# Patient Record
Sex: FEMALE | Race: OTHER | HISPANIC OR LATINO | Employment: STUDENT | URBAN - METROPOLITAN AREA
[De-identification: names, ages, dates, MRNs, and addresses within clinical notes are randomized per-mention and may not be internally consistent; named-entity substitution may affect disease eponyms.]

---
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
## 2024-01-13 ENCOUNTER — HOSPITAL ENCOUNTER (EMERGENCY)
Facility: HOSPITAL | Age: 19
Discharge: HOME | End: 2024-01-13
Attending: EMERGENCY MEDICINE
Payer: COMMERCIAL

## 2024-01-13 ENCOUNTER — APPOINTMENT (EMERGENCY)
Dept: CT IMAGING | Facility: HOSPITAL | Age: 19
End: 2024-01-13
Payer: COMMERCIAL

## 2024-01-13 VITALS
HEART RATE: 82 BPM | DIASTOLIC BLOOD PRESSURE: 79 MMHG | OXYGEN SATURATION: 98 % | RESPIRATION RATE: 16 BRPM | BODY MASS INDEX: 26.97 KG/M2 | SYSTOLIC BLOOD PRESSURE: 162 MMHG | WEIGHT: 138.1 LBS | TEMPERATURE: 97.9 F

## 2024-01-13 DIAGNOSIS — N94.6 DYSMENORRHEA: Primary | ICD-10-CM

## 2024-01-13 LAB
ALBUMIN SERPL BCP-MCNC: 4.5 G/DL (ref 3.5–5)
ALP SERPL-CCNC: 47 U/L (ref 34–104)
ALT SERPL W P-5'-P-CCNC: 11 U/L (ref 7–52)
ANION GAP SERPL CALCULATED.3IONS-SCNC: 12 MMOL/L
AST SERPL W P-5'-P-CCNC: 18 U/L (ref 13–39)
B-HCG SERPL-ACNC: <1 MIU/ML (ref 0–5)
BACTERIA UR QL AUTO: ABNORMAL /HPF
BASOPHILS # BLD AUTO: 0.11 THOUSANDS/ÂΜL (ref 0–0.1)
BASOPHILS NFR BLD AUTO: 2 % (ref 0–1)
BILIRUB SERPL-MCNC: 0.47 MG/DL (ref 0.2–1)
BILIRUB UR QL STRIP: NEGATIVE
BUN SERPL-MCNC: 10 MG/DL (ref 5–25)
CALCIUM SERPL-MCNC: 9.8 MG/DL (ref 8.4–10.2)
CHLORIDE SERPL-SCNC: 103 MMOL/L (ref 96–108)
CLARITY UR: ABNORMAL
CO2 SERPL-SCNC: 23 MMOL/L (ref 21–32)
COLOR UR: ABNORMAL
CREAT SERPL-MCNC: 0.8 MG/DL (ref 0.6–1.3)
EOSINOPHIL # BLD AUTO: 0.26 THOUSAND/ÂΜL (ref 0–0.61)
EOSINOPHIL NFR BLD AUTO: 4 % (ref 0–6)
ERYTHROCYTE [DISTWIDTH] IN BLOOD BY AUTOMATED COUNT: 12.1 % (ref 11.6–15.1)
GFR SERPL CREATININE-BSD FRML MDRD: 107 ML/MIN/1.73SQ M
GLUCOSE SERPL-MCNC: 157 MG/DL (ref 65–140)
GLUCOSE UR STRIP-MCNC: NEGATIVE MG/DL
HCT VFR BLD AUTO: 39.9 % (ref 34.8–46.1)
HGB BLD-MCNC: 13.2 G/DL (ref 11.5–15.4)
HGB UR QL STRIP.AUTO: 250
IMM GRANULOCYTES # BLD AUTO: 0.01 THOUSAND/UL (ref 0–0.2)
IMM GRANULOCYTES NFR BLD AUTO: 0 % (ref 0–2)
KETONES UR STRIP-MCNC: ABNORMAL MG/DL
LEUKOCYTE ESTERASE UR QL STRIP: 25
LYMPHOCYTES # BLD AUTO: 3.11 THOUSANDS/ÂΜL (ref 0.6–4.47)
LYMPHOCYTES NFR BLD AUTO: 48 % (ref 14–44)
MCH RBC QN AUTO: 29 PG (ref 26.8–34.3)
MCHC RBC AUTO-ENTMCNC: 33.1 G/DL (ref 31.4–37.4)
MCV RBC AUTO: 88 FL (ref 82–98)
MONOCYTES # BLD AUTO: 0.44 THOUSAND/ÂΜL (ref 0.17–1.22)
MONOCYTES NFR BLD AUTO: 7 % (ref 4–12)
MUCOUS THREADS UR QL AUTO: ABNORMAL
NEUTROPHILS # BLD AUTO: 2.53 THOUSANDS/ÂΜL (ref 1.85–7.62)
NEUTS SEG NFR BLD AUTO: 39 % (ref 43–75)
NITRITE UR QL STRIP: NEGATIVE
NON-SQ EPI CELLS URNS QL MICRO: ABNORMAL /HPF
NRBC BLD AUTO-RTO: 0 /100 WBCS
PH UR STRIP.AUTO: 5 [PH]
PLATELET # BLD AUTO: 408 THOUSANDS/UL (ref 149–390)
PMV BLD AUTO: 10.7 FL (ref 8.9–12.7)
POTASSIUM SERPL-SCNC: 3.7 MMOL/L (ref 3.5–5.3)
PROT SERPL-MCNC: 7.3 G/DL (ref 6.4–8.4)
PROT UR STRIP-MCNC: ABNORMAL MG/DL
RBC # BLD AUTO: 4.55 MILLION/UL (ref 3.81–5.12)
RBC #/AREA URNS AUTO: ABNORMAL /HPF
SODIUM SERPL-SCNC: 138 MMOL/L (ref 135–147)
SP GR UR STRIP.AUTO: 1.02 (ref 1–1.04)
UROBILINOGEN UA: NEGATIVE MG/DL
WBC # BLD AUTO: 6.46 THOUSAND/UL (ref 4.31–10.16)
WBC #/AREA URNS AUTO: ABNORMAL /HPF

## 2024-01-13 PROCEDURE — 36415 COLL VENOUS BLD VENIPUNCTURE: CPT

## 2024-01-13 PROCEDURE — 99285 EMERGENCY DEPT VISIT HI MDM: CPT

## 2024-01-13 PROCEDURE — 99284 EMERGENCY DEPT VISIT MOD MDM: CPT

## 2024-01-13 PROCEDURE — 81001 URINALYSIS AUTO W/SCOPE: CPT

## 2024-01-13 PROCEDURE — 96361 HYDRATE IV INFUSION ADD-ON: CPT

## 2024-01-13 PROCEDURE — 74177 CT ABD & PELVIS W/CONTRAST: CPT

## 2024-01-13 PROCEDURE — 80053 COMPREHEN METABOLIC PANEL: CPT

## 2024-01-13 PROCEDURE — 85025 COMPLETE CBC W/AUTO DIFF WBC: CPT

## 2024-01-13 PROCEDURE — 96375 TX/PRO/DX INJ NEW DRUG ADDON: CPT

## 2024-01-13 PROCEDURE — 84702 CHORIONIC GONADOTROPIN TEST: CPT

## 2024-01-13 PROCEDURE — 96374 THER/PROPH/DIAG INJ IV PUSH: CPT

## 2024-01-13 PROCEDURE — 81003 URINALYSIS AUTO W/O SCOPE: CPT

## 2024-01-13 PROCEDURE — G1004 CDSM NDSC: HCPCS

## 2024-01-13 RX ORDER — DROPERIDOL 2.5 MG/ML
0.62 INJECTION, SOLUTION INTRAMUSCULAR; INTRAVENOUS ONCE
Status: COMPLETED | OUTPATIENT
Start: 2024-01-13 | End: 2024-01-13

## 2024-01-13 RX ORDER — HYDROMORPHONE HCL/PF 1 MG/ML
1 SYRINGE (ML) INJECTION ONCE
Status: COMPLETED | OUTPATIENT
Start: 2024-01-13 | End: 2024-01-13

## 2024-01-13 RX ORDER — KETOROLAC TROMETHAMINE 30 MG/ML
15 INJECTION, SOLUTION INTRAMUSCULAR; INTRAVENOUS ONCE
Status: DISCONTINUED | OUTPATIENT
Start: 2024-01-13 | End: 2024-01-13

## 2024-01-13 RX ORDER — ONDANSETRON 4 MG/1
4 TABLET, ORALLY DISINTEGRATING ORAL ONCE
Status: DISCONTINUED | OUTPATIENT
Start: 2024-01-13 | End: 2024-01-13

## 2024-01-13 RX ORDER — ONDANSETRON 2 MG/ML
4 INJECTION INTRAMUSCULAR; INTRAVENOUS ONCE
Status: COMPLETED | OUTPATIENT
Start: 2024-01-13 | End: 2024-01-13

## 2024-01-13 RX ORDER — KETOROLAC TROMETHAMINE 30 MG/ML
15 INJECTION, SOLUTION INTRAMUSCULAR; INTRAVENOUS ONCE
Status: COMPLETED | OUTPATIENT
Start: 2024-01-13 | End: 2024-01-13

## 2024-01-13 RX ADMIN — SODIUM CHLORIDE 1000 ML: 0.9 INJECTION, SOLUTION INTRAVENOUS at 16:35

## 2024-01-13 RX ADMIN — IOHEXOL 100 ML: 350 INJECTION, SOLUTION INTRAVENOUS at 16:49

## 2024-01-13 RX ADMIN — KETOROLAC TROMETHAMINE 15 MG: 30 INJECTION, SOLUTION INTRAMUSCULAR; INTRAVENOUS at 15:41

## 2024-01-13 RX ADMIN — HYDROMORPHONE HYDROCHLORIDE 1 MG: 1 INJECTION, SOLUTION INTRAMUSCULAR; INTRAVENOUS; SUBCUTANEOUS at 16:05

## 2024-01-13 RX ADMIN — DROPERIDOL 0.62 MG: 2.5 INJECTION, SOLUTION INTRAMUSCULAR; INTRAVENOUS at 15:51

## 2024-01-13 RX ADMIN — ONDANSETRON 4 MG: 2 INJECTION INTRAMUSCULAR; INTRAVENOUS at 15:39

## 2024-01-13 NOTE — Clinical Note
Brian Guthrie was seen and treated in our emergency department on 1/13/2024.    No restrictions            Diagnosis:     Brian  .    She may return on this date: 01/15/2024         If you have any questions or concerns, please don't hesitate to call.      Gurwinder Cintron PA-C    ______________________________           _______________          _______________  Hospital Representative                              Date                                Time

## 2024-01-13 NOTE — ED PROVIDER NOTES
History  Chief Complaint   Patient presents with    Abdominal Cramping     Arrives reporting severe abd cramping that started this morning with her menses. States this has been happening the last few months. No meds PTA. No discharge or change in menses otherwise. Vomited a few times today.      Patient is a 18-year-old female coming in for evaluation of severe abdominal pain, nausea, vomiting.  Patient does have a history of dysmenorrhea, has not yet made an appointment OB/GYN.  Has not taken any pain medications or Zofran prior to arrival.  Patient is currently actively retching, clutching her stomach, and appears to be in distress, but nontoxic      Abdominal Cramping  Pain location:  Generalized  Associated symptoms: nausea and vomiting    Associated symptoms: no fatigue, no fever, no hematuria and no shortness of breath        Prior to Admission Medications   Prescriptions Last Dose Informant Patient Reported? Taking?   Diclofenac Sodium (VOLTAREN) 1 %   No No   Sig: Apply 2 g topically 4 (four) times a day for 5 days   acetaminophen (TYLENOL) 500 mg tablet   No No   Sig: Take 1 tablet (500 mg total) by mouth every 6 (six) hours as needed for moderate pain   acetaminophen (TYLENOL) 500 mg tablet   No No   Sig: Take 1 tablet (500 mg total) by mouth every 6 (six) hours as needed for mild pain   fluticasone (FLONASE) 50 mcg/act nasal spray   No No   Si spray into each nostril daily   fluticasone (FLONASE) 50 mcg/act nasal spray   No No   Si spray into each nostril daily   ibuprofen (MOTRIN) 400 mg tablet   No No   Sig: Take 1 tablet (400 mg total) by mouth every 6 (six) hours as needed for mild pain   ibuprofen (MOTRIN) 400 mg tablet   No No   Sig: Take 1 tablet (400 mg total) by mouth every 6 (six) hours as needed for mild pain for up to 15 days   loratadine (CLARITIN) 10 mg tablet   No No   Sig: Take 1 tablet (10 mg total) by mouth daily   Patient not taking: Reported on 2023   methocarbamol  (ROBAXIN) 500 mg tablet   No No   Sig: Take 1 tablet (500 mg total) by mouth 4 (four) times a day for 7 days   naproxen (Naprosyn) 500 mg tablet   No No   Sig: Take 1 tablet (500 mg total) by mouth 2 (two) times a day with meals   Patient not taking: Reported on 12/12/2023   naproxen (Naprosyn) 500 mg tablet   No No   Sig: Take 1 tablet (500 mg total) by mouth 2 (two) times a day with meals   neomycin-polymyxin-hydrocortisone (CORTISPORIN) 0.35%-10,000 units/mL-1% otic suspension   No No   Sig: Administer 4 drops to the right ear 3 (three) times a day   Patient not taking: Reported on 12/12/2023   ondansetron (ZOFRAN) 4 mg tablet   No No   Sig: Take 1 tablet (4 mg total) by mouth every 6 (six) hours   Patient not taking: Reported on 12/12/2023      Facility-Administered Medications: None       Past Medical History:   Diagnosis Date    Asthma        History reviewed. No pertinent surgical history.    History reviewed. No pertinent family history.  I have reviewed and agree with the history as documented.    E-Cigarette/Vaping    E-Cigarette Use Current Every Day User      E-Cigarette/Vaping Substances    Nicotine Yes      Social History     Tobacco Use    Smoking status: Never    Smokeless tobacco: Never   Vaping Use    Vaping status: Every Day    Substances: Nicotine   Substance Use Topics    Alcohol use: Never    Drug use: Never       Review of Systems   Constitutional:  Negative for fatigue and fever.   Respiratory:  Negative for chest tightness and shortness of breath.    Gastrointestinal:  Positive for abdominal pain, nausea and vomiting.   Genitourinary:  Negative for hematuria.       Physical Exam  Physical Exam  Vitals reviewed.   Constitutional:       General: She is in acute distress.      Appearance: Normal appearance. She is normal weight. She is not ill-appearing or toxic-appearing.   HENT:      Head: Normocephalic and atraumatic.      Right Ear: External ear normal.      Left Ear: External ear normal.       Nose: Nose normal.   Eyes:      Conjunctiva/sclera: Conjunctivae normal.   Cardiovascular:      Rate and Rhythm: Normal rate.   Pulmonary:      Effort: Pulmonary effort is normal.   Abdominal:      Palpations: Abdomen is soft.      Tenderness: There is no abdominal tenderness. There is no guarding.   Musculoskeletal:         General: Normal range of motion.      Cervical back: Normal range of motion.   Skin:     General: Skin is warm and dry.   Neurological:      Mental Status: She is alert.         Vital Signs  ED Triage Vitals   Temperature Pulse Respirations Blood Pressure SpO2   01/13/24 1534 01/13/24 1534 01/13/24 1534 01/13/24 1534 01/13/24 1534   97.9 °F (36.6 °C) 66 20 106/78 100 %      Temp Source Heart Rate Source Patient Position - Orthostatic VS BP Location FiO2 (%)   01/13/24 1534 01/13/24 1534 01/13/24 1534 01/13/24 1534 --   Tympanic Monitor Lying Left arm       Pain Score       01/13/24 1541       10 - Worst Possible Pain           Vitals:    01/13/24 1534 01/13/24 1702   BP: 106/78 162/79   Pulse: 66 82   Patient Position - Orthostatic VS: Lying Lying         Visual Acuity      ED Medications  Medications   ketorolac (TORADOL) injection 15 mg (15 mg Intravenous Given 1/13/24 1541)   ondansetron (ZOFRAN) injection 4 mg (4 mg Intravenous Given 1/13/24 1539)   droperidol (INAPSINE) injection 0.625 mg (0.625 mg Intravenous Given 1/13/24 1551)   HYDROmorphone (DILAUDID) injection 1 mg (1 mg Intravenous Given 1/13/24 1605)   iohexol (OMNIPAQUE) 350 MG/ML injection (MULTI-DOSE) 100 mL (100 mL Intravenous Given 1/13/24 1649)   sodium chloride 0.9 % bolus 1,000 mL (0 mL Intravenous Stopped 1/13/24 1805)       Diagnostic Studies  Results Reviewed       Procedure Component Value Units Date/Time    Urine Microscopic [[id number]]  (Abnormal) Collected: 01/13/24 1721    Lab Status: Final result Specimen: Urine, Clean Catch Updated: 01/13/24 1810     RBC, UA 30-50 /hpf      WBC, UA 4-10 /hpf      Epithelial  Cells Moderate /hpf      Bacteria, UA Moderate /hpf      MUCUS THREADS Occasional    UA w Reflex to Microscopic w Reflex to Culture [[id number]]  (Abnormal) Collected: 01/13/24 1721    Lab Status: Final result Specimen: Urine, Clean Catch Updated: 01/13/24 1755     Color, UA Rosy     Clarity, UA Slightly Cloudy     Specific Gravity, UA 1.020     pH, UA 5.0     Leukocytes, UA 25.0     Nitrite, UA Negative     Protein, UA 30 (1+) mg/dl      Glucose, UA Negative mg/dl      Ketones, UA 5 (Trace) mg/dl      Bilirubin, UA Negative     Occult Blood, .0     UROBILINOGEN UA Negative mg/dL     hCG, quantitative [[id number]]  (Normal) Collected: 01/13/24 1542    Lab Status: Final result Specimen: Blood from Arm, Right Updated: 01/13/24 1631     HCG, Quant <1 mIU/mL     Narrative:       Expected Ranges:    HCG results between 5 and 25 mIU/mL may be indicative of early pregnancy but should be interpreted in light of the total clinical presentation.    HCG can rise to detectable levels in sylvester and post menopausal women (0-11.6 mIU/mL).     Approximate               Approximate HCG  Gestation age          Concentration ( mIU/mL)  _____________          ______________________   Weeks                      HCG values  0.2-1                       5-50  1-2                           2-3                         100-5000  3-4                         500-84491  4-5                         1000-71526  5-6                         [id number]  6-8                         60405-095609  8-12                        [id number]      Comprehensive metabolic panel [[id number]]  (Abnormal) Collected: 01/13/24 1542    Lab Status: Final result Specimen: Blood from Arm, Right Updated: 01/13/24 1604     Sodium 138 mmol/L      Potassium 3.7 mmol/L      Chloride 103 mmol/L      CO2 23 mmol/L      ANION GAP 12 mmol/L      BUN 10 mg/dL      Creatinine 0.80 mg/dL      Glucose 157 mg/dL      Calcium 9.8 mg/dL      AST 18 U/L      ALT 11 U/L       Alkaline Phosphatase 47 U/L      Total Protein 7.3 g/dL      Albumin 4.5 g/dL      Total Bilirubin 0.47 mg/dL      eGFR 107 ml/min/1.73sq m     Narrative:      National Kidney Disease Foundation guidelines for Chronic Kidney Disease (CKD):     Stage 1 with normal or high GFR (GFR > 90 mL/min/1.73 square meters)    Stage 2 Mild CKD (GFR = 60-89 mL/min/1.73 square meters)    Stage 3A Moderate CKD (GFR = 45-59 mL/min/1.73 square meters)    Stage 3B Moderate CKD (GFR = 30-44 mL/min/1.73 square meters)    Stage 4 Severe CKD (GFR = 15-29 mL/min/1.73 square meters)    Stage 5 End Stage CKD (GFR <15 mL/min/1.73 square meters)  Note: GFR calculation is accurate only with a steady state creatinine    CBC and differential [[id number]]  (Abnormal) Collected: 01/13/24 1542    Lab Status: Final result Specimen: Blood from Arm, Right Updated: 01/13/24 1548     WBC 6.46 Thousand/uL      RBC 4.55 Million/uL      Hemoglobin 13.2 g/dL      Hematocrit 39.9 %      MCV 88 fL      MCH 29.0 pg      MCHC 33.1 g/dL      RDW 12.1 %      MPV 10.7 fL      Platelets 408 Thousands/uL      nRBC 0 /100 WBCs      Neutrophils Relative 39 %      Immat GRANS % 0 %      Lymphocytes Relative 48 %      Monocytes Relative 7 %      Eosinophils Relative 4 %      Basophils Relative 2 %      Neutrophils Absolute 2.53 Thousands/µL      Immature Grans Absolute 0.01 Thousand/uL      Lymphocytes Absolute 3.11 Thousands/µL      Monocytes Absolute 0.44 Thousand/µL      Eosinophils Absolute 0.26 Thousand/µL      Basophils Absolute 0.11 Thousands/µL                    CT abdomen pelvis with contrast   Final Result by Eliceo Rubio MD (01/13 1804)      Mild bladder wall thickening. Correlate for acute cystitis.      Simple left ovarian cyst measuring 3.1 cm.            Workstation performed: [id number]                    Procedures  Procedures         ED Course  ED Course as of 01/13/24 1922   Sat Jan 13, 2024   1608 Anion Gap: 12   1611 Patient continues to have  "severe pain after Toradol.  Continue to vomit, and crying out in pain.  Given droperidol with no significant effect seen.  Will give Dilaudid, and get imaging   1633 HCG QUANTITATIVE: <1   1756 Blood, UA(!): 250.0  On her period   1808 CT abdomen pelvis with contrast  Mild bladder wall thickening. Correlate for acute cystitis.     Simple left ovarian cyst measuring 3.1 cm.     1810 Bacteria, UA(!): Moderate  Dirty catch. No dysuria   1810 Pain is better. Will d/c         CRAFFT      Flowsheet Row Most Recent Value   CRAFFT Initial Screen: During the past 12 months, did you:    1. Drink any alcohol (more than a few sips)?  No Filed at: 01/13/2024 6759   2. Smoke any marijuana or hashish No Filed at: 01/13/2024 9102   3. Use anything else to get high? (\"anything else\" includes illegal drugs, over the counter and prescription drugs, and things that you sniff or 'singh')? No Filed at: 01/13/2024 6473                                            Medical Decision Making  Patient is a 18-year-old female who comes in for evaluation of abdominal pain.  Labs and CAT scan are overall benign.  Patient does have a history of severe dysmenorrhea.  There is a large ovarian cyst, but no sign of ovarian torsion on CAT scan.  Patient did feel bloated after Dilaudid, and droperidol, Toradol.  Patient was discharged home with the name of a local OB/GYN, and encouraged to follow-up for further evaluation and treatment    Amount and/or Complexity of Data Reviewed  Labs: ordered. Decision-making details documented in ED Course.  Radiology: ordered. Decision-making details documented in ED Course.    Risk  Prescription drug management.             Disposition  Final diagnoses:   Dysmenorrhea     Time reflects when diagnosis was documented in both MDM as applicable and the Disposition within this note       Time User Action Codes Description Comment    1/13/2024  6:09 PM Gurwinder Cintron Add [N94.6] Dysmenorrhea           ED Disposition       " ED Disposition   Discharge    Condition   Stable    Date/Time   Sat Jan 13, 2024 1807    Comment   Brian Guthrie discharge to home/self care.                   Follow-up Information       Follow up With Specialties Details Why Contact Info Additional Information    [facility]   [address]  [address]  [facility] [address] [phone]       [facility] Emergency Department Emergency Medicine  As needed, If symptoms worsen [address] [facility] [address] [phone] [facility] Emergency Department    [facility]etric[facility] Gyne[facility]   [address] [place] [address] [phone] [facility], [address] [place] [address], [address], [place], [place], [address] [phone]            Discharge Medication List as of 1/13/2024  6:10 PM        CONTINUE these medications which have NOT CHANGED    Details   !! acetaminophen (TYLENOL) 500 mg tablet Take 1 tablet (500 mg total) by mouth every 6 (six) hours as needed for moderate pain, Starting Wed 10/26/2022, Normal      !! acetaminophen (TYLENOL) 500 mg tablet Take 1 tablet (500 mg total) by mouth every 6 (six) hours as needed for mild pain, Starting Mon 6/12/2023, Normal      Diclofenac Sodium (VOLTAREN) 1 % Apply 2 g topically 4 (four) times a day for 5 days, Starting Tue 10/10/2023, Until Sun 10/15/2023, Normal      !! fluticasone (FLONASE) 50 mcg/act nasal spray 1 spray into each nostril daily, Starting Wed 10/26/2022, Normal      !! fluticasone (FLONASE) 50 mcg/act nasal spray 1 spray into each nostril daily, Starting Wed 4/26/2023, Normal      ibuprofen (MOTRIN) 400 mg tablet Take 1 tablet (400 mg total) by mouth every 6 (six) hours as needed for mild pain, Starting Thu 11/18/2021, Normal      loratadine (CLARITIN) 10 mg tablet Take 1 tablet (10 mg total) by mouth daily, Starting Wed  4/26/2023, Normal      methocarbamol (ROBAXIN) 500 mg tablet Take 1 tablet (500 mg total) by mouth 4 (four) times a day for 7 days, Starting Tue 10/10/2023, Until Tue 10/17/2023, Normal      !! naproxen (Naprosyn) 500 mg tablet Take 1 tablet (500 mg total) by mouth 2 (two) times a day with meals, Starting Mon 6/12/2023, Normal      !! naproxen (Naprosyn) 500 mg tablet Take 1 tablet (500 mg total) by mouth 2 (two) times a day with meals, Starting Tue 12/12/2023, Normal      neomycin-polymyxin-hydrocortisone (CORTISPORIN) 0.35%-10,000 units/mL-1% otic suspension Administer 4 drops to the right ear 3 (three) times a day, Starting Wed 10/26/2022, Normal      ondansetron (ZOFRAN) 4 mg tablet Take 1 tablet (4 mg total) by mouth every 6 (six) hours, Starting Mon 6/12/2023, Normal       !! - Potential duplicate medications found. Please discuss with provider.          No discharge procedures on file.    PDMP Review       None            ED Provider  Electronically Signed by             Gurwinder Cintron PA-C  01/13/24 1922

## 2024-02-13 ENCOUNTER — TELEPHONE (OUTPATIENT)
Dept: OBGYN CLINIC | Facility: CLINIC | Age: 19
End: 2024-02-13

## 2024-03-07 ENCOUNTER — ULTRASOUND (OUTPATIENT)
Dept: OBGYN CLINIC | Facility: CLINIC | Age: 19
End: 2024-03-07

## 2024-03-07 VITALS
WEIGHT: 135.6 LBS | BODY MASS INDEX: 25.6 KG/M2 | SYSTOLIC BLOOD PRESSURE: 138 MMHG | HEIGHT: 61 IN | DIASTOLIC BLOOD PRESSURE: 85 MMHG | HEART RATE: 90 BPM

## 2024-03-07 DIAGNOSIS — N91.2 AMENORRHEA: Primary | ICD-10-CM

## 2024-03-07 LAB — SL AMB POCT URINE HCG: POSITIVE

## 2024-03-07 NOTE — PROGRESS NOTES
[facility]TRAS[facility]  3/7/2024  Rosa Greco MD     INDICATION: Amenorrhea, viability    COMPARISON: None.     TECHNIQUE:   Transvaginal imaging was performed to assess the gestation, myometrial/endometrial architecture and ovarian parenchymal detail.    The study includes volumetric sweeps and traditional still imaging technique.      FINDINGS:     A single intrauterine gestation is identified.  Cardiac activity is detected at 162bpm.      YOLK SAC:  Present and normal in size and appearance.  MEAN CROWN RUMP LENGTH:  1.2 cm = 7 weeks 3 days   AMNIOTIC FLUID/SAC SHAPE:  Within expected normal range.     UTERUS/ADNEXA:   No adnexal mass or pathologic cyst.  No free fluid identified.     IMPRESSION:    According to , will date based off LMP (1/13/2024)  Final JABIER:  10/19/2024  Gestational age:  7w5d  Fetal cardiac activity detected.  No adnexal masses seen.     Dr. Frazier was present for the entire visit    Rosa Greco MD  Obstetrics & Gynecology, PGY-2

## 2024-03-18 ENCOUNTER — INITIAL PRENATAL (OUTPATIENT)
Dept: OBGYN CLINIC | Facility: CLINIC | Age: 19
End: 2024-03-18

## 2024-03-18 VITALS
SYSTOLIC BLOOD PRESSURE: 140 MMHG | HEART RATE: 74 BPM | HEIGHT: 61 IN | BODY MASS INDEX: 25.68 KG/M2 | DIASTOLIC BLOOD PRESSURE: 73 MMHG | WEIGHT: 136 LBS

## 2024-03-18 DIAGNOSIS — Z59.819 HOUSING INSTABILITY: Primary | ICD-10-CM

## 2024-03-18 DIAGNOSIS — Z3A.09 9 WEEKS GESTATION OF PREGNANCY: ICD-10-CM

## 2024-03-18 DIAGNOSIS — Z34.91 PRENATAL CARE IN FIRST TRIMESTER: ICD-10-CM

## 2024-03-18 DIAGNOSIS — Z31.430 ENCOUNTER OF FEMALE FOR TESTING FOR GENETIC DISEASE CARRIER STATUS FOR PROCREATIVE MANAGEMENT: ICD-10-CM

## 2024-03-18 PROCEDURE — 99211 OFF/OP EST MAY X REQ PHY/QHP: CPT

## 2024-03-18 SDOH — ECONOMIC STABILITY - HOUSING INSECURITY: HOUSING INSTABILITY UNSPECIFIED: Z59.819

## 2024-03-18 NOTE — LETTER
"    [facility] REFERRAL      Date: 3/18/2024    Patient name: Brian Guthrie    YOB: 2005    Estimated Date of Delivery: 10/19/24      /73 (BP Location: Right arm, Patient Position: Sitting, Cuff Size: Standard)   Pulse 74   Ht 5' 1\" (1.549 m)   Wt 61.7 kg (136 lb)   LMP 01/13/2024 (Exact Date)   Breastfeeding No   BMI 25.70 kg/m²         Thank you,  ANA LAURA Ibarra            [facility] locations:   1. [facility]       [address] [place] [address]       Phone: [phone]       Fax: [phone]     2. [facility]       [address] [address]       Phone: [phone]       Fax: [phone]       "

## 2024-03-18 NOTE — PROGRESS NOTES
"OBSTETRIC INTAKE VISIT    Brian Guthrie presents today for initial OB visit and intake at 9w2d. LMP - 24. History obtained from patient and she reports it as follows:    Past Medical History:   Diagnosis Date    Asthma      History reviewed. No pertinent surgical history.  OB History    Para Term  AB Living   1 0   0 0 0   SAB IAB Ectopic Multiple Live Births   0 0 0 0 0      # Outcome Date GA Lbr [place]/2nd Weight Sex Delivery Anes PTL Lv   1 Current              Social History     Tobacco Use    Smoking status: Never    Smokeless tobacco: Never   Vaping Use    Vaping status: Every Day    Last attempt to quit: 2024    Substances: Nicotine   Substance Use Topics    Alcohol use: Never    Drug use: Never       Current Outpatient Medications   Medication Instructions    acetaminophen (TYLENOL) 500 mg, Oral, Every 6 hours PRN    acetaminophen (TYLENOL) 500 mg, Oral, Every 6 hours PRN    Diclofenac Sodium (VOLTAREN) 2 g, Topical, 4 times daily    fluticasone (FLONASE) 50 mcg/act nasal spray 1 spray, Nasal, Daily    fluticasone (FLONASE) 50 mcg/act nasal spray 1 spray, Nasal, Daily    ibuprofen (MOTRIN) 400 mg, Oral, Every 6 hours PRN    ibuprofen (MOTRIN) 400 mg, Oral, Every 6 hours PRN    loratadine (CLARITIN) 10 mg, Oral, Daily    methocarbamol (ROBAXIN) 500 mg, Oral, 4 times daily    naproxen (NAPROSYN) 500 mg, Oral, 2 times daily with meals    naproxen (NAPROSYN) 500 mg, Oral, 2 times daily with meals    neomycin-polymyxin-hydrocortisone (CORTISPORIN) 0.35%-10,000 units/mL-1% otic suspension 4 drops, Right Ear, 3 times daily    ondansetron (ZOFRAN) 4 mg, Oral, Every 6 hours       Allergies   Allergen Reactions    Amoxicillin     Penicillins Hives and Other (See Comments)     Not known at this time. Presumably amoxicillin rash in infancy         Vitals: /73 (BP Location: Right arm, Patient Position: Sitting, Cuff Size: Standard)   Pulse 74   Ht 5' 1\" (1.549 m)   Wt 61.7 kg (136 lb)   " LMP 01/13/2024 (Exact Date)   Breastfeeding No   BMI 25.70 kg/m²     Review of Systems:  Denies vaginal bleeding or leaking fluid.  Denies abdominal/pelvic pain or contractions.    Plan:  1. OB labwork ordered today to include Prenatal Panel, HCV antibody, Hgb fractionation cascade, Prenatal Carrier Screen Panel.  Other labs include Glucose 1H PG.  Advised patient to have drawn within the next few days.  2. Will help pt schedule ultrasound with M.  3. Return 4/10/24 for H&P prenatal visit.  4. Referrals placed for WIC, , and [facility].  5. Given vaccines: Flu vaccine declined.  6. Patient's depression screening was assessed with a PHQ-2 score of 0. Their PHQ-9 score was 2. Clinically patient does not have depression. No treatment is required.   will call or see pt at her next visit.  7. Reviewed the following educational topics with patient:   -routine prenatal visit/ultrasound/labwork schedule   -hospital for delivery and office phone/answering service contact information   -nutritional demands of pregnancy, healthy dietary habits   -listeria, toxoplasmosis, seafood precautions   -weight gain expectations (based on pre-pregnant BMI)   -exercise, rest, and sexual activity during pregnancy   -abstinence from alcohol, tobacco, and illegal drugs   -common discomforts of pregnancy and appropriate management   -OTC medications safe to use in pregnancy   -genetic screening options   -vaccines in pregnancy   -symptoms to report to OB provider    -signs of PTL and pre-eclampsia    -vaginal bleeding/leaking of fluid    -severe n/v-unable to tolerate ANY food/fluids for more than 24 hours

## 2024-03-18 NOTE — PATIENT INSTRUCTIONS
WARNING SIGNS DURING PREGNANCY  Call our office at [phone] for any of the followin. Vaginal bleeding  2. Sharp abdominal pain that does not go away  3. Fever (more than 100.4 and is not relieved by Tylenol)  4. Persistent vomiting lasting greater than 24 hours  5. Chest pain   6. Pain or burning when you urinate  7. Severe headache that doesn't resolve with Tylenol  8. Blurred vision or seeing spots in your vision  9. Sudden swelling of your face or hands  10. Redness, swelling or pain in a leg  11. A sudden weight gain in just a few days  12. Decrease in your baby's movement (after 28 weeks or the 6th month of pregnancy)  13. A loss of watery fluid from your vagina - can be a gush, a trickle or continuous wetness  14. After 20 weeks of pregnancy, rhythmic cramping (greater than 4 per hour) or menstrual like low/pelvic pain

## 2024-03-18 NOTE — LETTER
3/18/2024      This letter is to confirm that Brian Guthrie is pregnant and is under our care.  Her Estimated Date of Delivery: 10/19/24.    If you have any questions or concerns, please contact our office.  Thank you,    ANA LAURA Ibarra

## 2024-04-02 ENCOUNTER — PATIENT OUTREACH (OUTPATIENT)
Dept: OBGYN CLINIC | Facility: CLINIC | Age: 19
End: 2024-04-02

## 2024-04-02 NOTE — PROGRESS NOTES
DANIEL SHAH was referred by ANA LAURA Pena to complete a PN SW assessment. The patient is , she is [id number], her JABIER is 10/19/2024. The patient is english speaking. DANIEL SHAH attemped to outreach by phone today to complete the assessment. There was no answer. DANIEL SHAH left a  for a return call. Pts next appointment is scheduled for 04/10

## 2024-04-06 ENCOUNTER — HOSPITAL ENCOUNTER (EMERGENCY)
Facility: HOSPITAL | Age: 19
Discharge: HOME | End: 2024-04-06
Attending: EMERGENCY MEDICINE
Payer: COMMERCIAL

## 2024-04-06 VITALS
RESPIRATION RATE: 18 BRPM | TEMPERATURE: 98.3 F | SYSTOLIC BLOOD PRESSURE: 133 MMHG | OXYGEN SATURATION: 100 % | WEIGHT: 138.01 LBS | DIASTOLIC BLOOD PRESSURE: 72 MMHG | HEART RATE: 93 BPM | BODY MASS INDEX: 26.08 KG/M2

## 2024-04-06 DIAGNOSIS — R51.9 HEADACHE: Primary | ICD-10-CM

## 2024-04-06 PROCEDURE — 96374 THER/PROPH/DIAG INJ IV PUSH: CPT

## 2024-04-06 PROCEDURE — 96361 HYDRATE IV INFUSION ADD-ON: CPT

## 2024-04-06 PROCEDURE — 99283 EMERGENCY DEPT VISIT LOW MDM: CPT

## 2024-04-06 PROCEDURE — 99284 EMERGENCY DEPT VISIT MOD MDM: CPT | Performed by: EMERGENCY MEDICINE

## 2024-04-06 RX ORDER — METOCLOPRAMIDE HYDROCHLORIDE 5 MG/ML
10 INJECTION INTRAMUSCULAR; INTRAVENOUS ONCE
Status: COMPLETED | OUTPATIENT
Start: 2024-04-06 | End: 2024-04-06

## 2024-04-06 RX ORDER — BUTALBITAL, ACETAMINOPHEN AND CAFFEINE 50; 325; 40 MG/1; MG/1; MG/1
1 TABLET ORAL ONCE
Status: COMPLETED | OUTPATIENT
Start: 2024-04-06 | End: 2024-04-06

## 2024-04-06 RX ADMIN — SODIUM CHLORIDE 1000 ML: 0.9 INJECTION, SOLUTION INTRAVENOUS at 13:44

## 2024-04-06 RX ADMIN — BUTALBITAL, ACETAMINOPHEN, AND CAFFEINE 1 TABLET: 325; 50; 40 TABLET ORAL at 13:45

## 2024-04-06 RX ADMIN — METOCLOPRAMIDE 10 MG: 5 INJECTION, SOLUTION INTRAMUSCULAR; INTRAVENOUS at 13:44

## 2024-04-06 NOTE — ED PROVIDER NOTES
History  Chief Complaint   Patient presents with    Headache     Began yesterday with radiation to the L neck. Pt denies injury.      Patient is a 18-year-old female with past medical history of asthma, juvenile arthritis, and 12 weeks pregnant presenting with headache.  Patient states that she has had this left-sided headache for 2 days and it feels like a constant pounding that is a 7 out of 10 pain.  She states that it occasionally will go away a little bit but has never completely gone away.  She denies any migraine history personally, but her mom does have migraines.  She has photo/phonosensitivity.  She denies any blurry vision, facial weakness, numbness, or tingling or any other neurologic symptoms.  The headache was not maximal at onset and has been continuing to get worse.  She denies lightheadedness, dizziness, fever, chills, diaphoresis, chest pain, shortness of breath, abdominal pain, nausea, vomiting, urinary symptoms, numbness, tingling, or weakness throughout the body.        None       Past Medical History:   Diagnosis Date    Asthma     Juvenile arthritis (HCC) 10/10/2014       History reviewed. No pertinent surgical history.    Family History   Problem Relation Age of Onset    Diabetes Sister     Diabetes Maternal Grandmother     Diabetes Maternal Uncle     Cancer Neg Hx     Ovarian cancer Neg Hx     Colon cancer Neg Hx     Breast cancer Neg Hx      I have reviewed and agree with the history as documented.    E-Cigarette/Vaping    E-Cigarette Use Former User     Quit Date 2/26/24      E-Cigarette/Vaping Substances    Nicotine Yes      Social History     Tobacco Use    Smoking status: Never    Smokeless tobacco: Never   Vaping Use    Vaping status: Former    Quit date: 2/26/2024    Substances: Nicotine   Substance Use Topics    Alcohol use: Never    Drug use: Never        Review of Systems    Physical Exam  ED Triage Vitals [04/06/24 1258]   Temperature Pulse Respirations Blood Pressure SpO2   98.3  °F (36.8 °C) 93 18 133/72 100 %      Temp Source Heart Rate Source Patient Position - Orthostatic VS BP Location FiO2 (%)   Oral -- Sitting Right arm --      Pain Score       8             Orthostatic Vital Signs  Vitals:    04/06/24 1258   BP: 133/72   Pulse: 93   Patient Position - Orthostatic VS: Sitting       Physical Exam  Vitals and nursing note reviewed.   Constitutional:       General: She is not in acute distress.     Appearance: Normal appearance. She is not ill-appearing, toxic-appearing or diaphoretic.      Comments: Uncomfortable   HENT:      Head: Normocephalic and atraumatic.      Mouth/Throat:      Mouth: Mucous membranes are moist.      Pharynx: Oropharynx is clear. No oropharyngeal exudate or posterior oropharyngeal erythema.   Eyes:      General: No scleral icterus.        Right eye: No discharge.         Left eye: No discharge.      Extraocular Movements: Extraocular movements intact.      Pupils: Pupils are equal, round, and reactive to light.   Cardiovascular:      Rate and Rhythm: Normal rate and regular rhythm.      Heart sounds: Normal heart sounds.   Pulmonary:      Effort: Pulmonary effort is normal. No respiratory distress.      Breath sounds: Normal breath sounds.   Abdominal:      General: Abdomen is flat. There is no distension.      Palpations: Abdomen is soft.      Tenderness: There is no abdominal tenderness.   Musculoskeletal:         General: Normal range of motion.      Cervical back: Normal range of motion and neck supple.   Skin:     General: Skin is warm and dry.      Capillary Refill: Capillary refill takes less than 2 seconds.   Neurological:      General: No focal deficit present.      Mental Status: She is alert and oriented to person, place, and time.      Cranial Nerves: No cranial nerve deficit.      Sensory: No sensory deficit.      Motor: No weakness.         ED Medications  Medications   metoclopramide (REGLAN) injection 10 mg (10 mg Intravenous Given 4/6/24 1480)  "  sodium chloride 0.9 % bolus 1,000 mL (0 mL Intravenous Stopped 4/6/24 1451)   butalbital-acetaminophen-caffeine (FIORICET,ESGIC) -40 mg per tablet 1 tablet (1 tablet Oral Given 4/6/24 1345)       Diagnostic Studies  Results Reviewed       None                   No orders to display         Procedures  Procedures      ED Course         CRAFFT      Flowsheet Row Most Recent Value   CRAFFT Initial Screen: During the past 12 months, did you:    1. Drink any alcohol (more than a few sips)?  No Filed at: 04/06/2024 1317   2. Smoke any marijuana or hashish No Filed at: 04/06/2024 1317   3. Use anything else to get high? (\"anything else\" includes illegal drugs, over the counter and prescription drugs, and things that you sniff or 'singh')? No Filed at: 04/06/2024 1317                                      Medical Decision Making  Patient is a 18-year-old female presenting with headache.    Differential includes primary versus secondary headache.  Clinical signs and symptoms pointing towards primary headache.  Low concern for secondary headache at this time.  Will treat for primary headache and reassess.  Patient felt significantly better after treatment.    Patient cleared for discharge with PCP follow-up and return precautions.    Risk  Prescription drug management.          Disposition  Final diagnoses:   Headache     Time reflects when diagnosis was documented in both MDM as applicable and the Disposition within this note       Time User Action Codes Description Comment    4/6/2024  2:48 PM Jv Martini Add [R51.9] Headache           ED Disposition       ED Disposition   Discharge    Condition   Stable    Date/Time   Sat Apr 6, 2024 0290    Comment   Brian Guthrie discharge to home/self care.                   Follow-up Information       Follow up With Specialties Details Why Contact Info Additional Information    [facility]   [address]  [address]  [place] [address] [phone]    "    [facility]   [address] [id number] [phone] [facility], [address], [place], [address]            Patient's Medications   Discharge Prescriptions    No medications on file     No discharge procedures on file.    PDMP Review       None             ED Provider  Attending physically available and evaluated Brian Guthrie. I managed the patient along with the ED Attending.    Electronically Signed by           Jv Martini MD  04/06/24 1495

## 2024-04-06 NOTE — ED ATTENDING ATTESTATION
4/6/2024  I, Clarke Emerson MD, saw and evaluated the patient. I have discussed the patient with the resident/non-physician practitioner and agree with the resident's/non-physician practitioner's findings, Plan of Care, and MDM as documented in the resident's/non-physician practitioner's note, except where noted. All available labs and Radiology studies were reviewed.  I was present for key portions of any procedure(s) performed by the resident/non-physician practitioner and I was immediately available to provide assistance.       At this point I agree with the current assessment done in the Emergency Department.  I have conducted an independent evaluation of this patient a history and physical is as follows:  17 yo F c/o headache, at 12 weeks gestation G1P, described as left parietal, onset 2 days, with photophobia, phonophobia, no fever, no chills, no N/V.  She denies injury.  Chest clear.  Abd S/NT.  No focal neuro deficits.  No neck stiffness.        ED Course         Critical Care Time  Procedures

## 2024-04-06 NOTE — DISCHARGE INSTRUCTIONS
Please follow-up with your primary care provider.  Please return to the ED with new or worsening symptoms-see attached.

## 2024-04-10 ENCOUNTER — INITIAL PRENATAL (OUTPATIENT)
Dept: OBGYN CLINIC | Facility: CLINIC | Age: 19
End: 2024-04-10

## 2024-04-10 ENCOUNTER — PATIENT OUTREACH (OUTPATIENT)
Dept: OBGYN CLINIC | Facility: CLINIC | Age: 19
End: 2024-04-10

## 2024-04-10 VITALS
HEIGHT: 61 IN | SYSTOLIC BLOOD PRESSURE: 136 MMHG | HEART RATE: 92 BPM | DIASTOLIC BLOOD PRESSURE: 83 MMHG | BODY MASS INDEX: 27.03 KG/M2 | WEIGHT: 143.2 LBS

## 2024-04-10 DIAGNOSIS — G43.909 MIGRAINE WITHOUT STATUS MIGRAINOSUS, NOT INTRACTABLE, UNSPECIFIED MIGRAINE TYPE: ICD-10-CM

## 2024-04-10 DIAGNOSIS — Z3A.12 12 WEEKS GESTATION OF PREGNANCY: ICD-10-CM

## 2024-04-10 DIAGNOSIS — Z34.91 PRENATAL CARE IN FIRST TRIMESTER: Primary | ICD-10-CM

## 2024-04-10 DIAGNOSIS — Z11.3 SCREEN FOR STD (SEXUALLY TRANSMITTED DISEASE): ICD-10-CM

## 2024-04-10 DIAGNOSIS — Z86.69 HISTORY OF MIGRAINE HEADACHES: ICD-10-CM

## 2024-04-10 PROCEDURE — 87491 CHLMYD TRACH DNA AMP PROBE: CPT

## 2024-04-10 PROCEDURE — 87591 N.GONORRHOEAE DNA AMP PROB: CPT

## 2024-04-10 PROCEDURE — 99213 OFFICE O/P EST LOW 20 MIN: CPT | Performed by: OBSTETRICS & GYNECOLOGY

## 2024-04-10 RX ORDER — PNV NO.95/FERROUS FUM/FOLIC AC 28MG-0.8MG
1 TABLET ORAL DAILY
Qty: 90 TABLET | Refills: 4 | Status: SHIPPED | OUTPATIENT
Start: 2024-04-10 | End: 2024-10-07

## 2024-04-10 RX ORDER — ASPIRIN 81 MG/1
162 TABLET, CHEWABLE ORAL DAILY
Qty: 60 TABLET | Refills: 5 | Status: SHIPPED | OUTPATIENT
Start: 2024-04-10 | End: 2024-10-07

## 2024-04-10 RX ORDER — METOCLOPRAMIDE 10 MG/1
10 TABLET ORAL EVERY 6 HOURS PRN
Qty: 30 TABLET | Refills: 2 | Status: SHIPPED | OUTPATIENT
Start: 2024-04-10 | End: 2024-07-03

## 2024-04-10 NOTE — PROGRESS NOTES
DANIEL SHAH was referred by ANA LAURA Pena to see pt for a PN SW assessment in the first trimester. Pt is , she is 22g4sAC, her JABIER is 10/19/2024, she is english speaking and is here with her partner today for her OB H&P. DANIEL SHAH met with the pt in the office.     Pt reports this pregnancy is unplanned but welcomed. She lives with her mother but she and her partner are looking to get their own place before the baby is born. She has good family support. She has SNAP through her mom. She has MA, her first WIC appt is . 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 She walks to appointments. She has no concerns obtaining baby supplies.     She denies any CYS, legal, IPV, MH or D&A history or concerns.     DANIEL SHAH encouraged her to outreach as needed.

## 2024-04-10 NOTE — PROGRESS NOTES
OB/GYN  PRENATAL H&P VISIT  Brian Guthrie  4/10/2024  2:28 PM  Dr. Anuel Lowery MD      SUBJECTIVE  Patient is a  at 12w4d here for initial prenatal H&P. This is an unintended and desired pregnancy.     She is currently doing well. She will begin working at a [employer] on Monday.     She denies hx of STD/STI, denies a hx of TB or close contacts with persons with TB. She has never had MRSA.     She denies a family history of inheritable conditions such as physical or intellectual disabilities, birth defects, blood disorders, heart or neural tube defects.     She denies recent travel or travel planned in the near future.     She denies use of nicotine or recreational drug use. She denies use of ETOH.    She denies vaginal bleeding, cramping, leakage, abnormal discharge.     OB History    Para Term  AB Living   1 0 0 0 0 0   SAB IAB Ectopic Multiple Live Births   0 0 0 0 0      # Outcome Date GA Lbr Col[place]/2nd Weight Sex Delivery Anes PTL Lv   1 Current                Review of Systems   Constitutional:  Negative for chills and fever.   Eyes:  Negative for visual disturbance.   Respiratory:  Negative for shortness of breath.    Cardiovascular:  Negative for chest pain.   Gastrointestinal:  Negative for diarrhea, nausea and vomiting.   Genitourinary:  Negative for dysuria, flank pain, hematuria, vaginal bleeding and vaginal discharge.   Skin:  Negative for rash.   Neurological:  Negative for dizziness, numbness and headaches.   All other systems reviewed and are negative.      Past Medical History:   Diagnosis Date    Asthma     Juvenile arthritis (HCC) 10/10/2014       No past surgical history on file.    Social History     Socioeconomic History    Marital status: Single     Spouse name: Not on file    Number of children: Not on file    Years of education: Not on file    Highest education level: Not on file   Occupational History    Not on file   Tobacco Use    Smoking status: Never    Smokeless  tobacco: Never   Vaping Use    Vaping status: Former    Quit date: 2/26/2024    Substances: Nicotine   Substance and Sexual Activity    Alcohol use: Never    Drug use: Never    Sexual activity: Not Currently     Partners: Male     Birth control/protection: None   Other Topics Concern    Not on file   Social History Narrative    Not on file     Social Determinants of Health     Financial Resource Strain: Low Risk  (4/10/2024)    Overall Financial Resource Strain (CARDIA)     Difficulty of Paying Living Expenses: Not hard at all   Food Insecurity: No Food Insecurity (4/10/2024)    Hunger Vital Sign     Worried About Running Out of Food in the Last Year: Never true     Ran Out of Food in the Last Year: Never true   Transportation Needs: No Transportation Needs (4/10/2024)    PRAPARE - Transportation     Lack of Transportation (Medical): No     Lack of Transportation (Non-Medical): No   Physical Activity: Not on file   Stress: Not on file   Social Connections: Not on file   Intimate Partner Violence: Not on file   Housing Stability: Low Risk  (4/10/2024)    Housing Stability Vital Sign     Unable to Pay for Housing in the Last Year: No     Number of Places Lived in the Last Year: 1     Unstable Housing in the Last Year: No   Recent Concern: Housing Stability - High Risk (3/18/2024)    Housing Stability Vital Sign     Unable to Pay for Housing in the Last Year: Yes     Number of Places Lived in the Last Year: 2     Unstable Housing in the Last Year: No       OBJECTIVE  Vitals:    04/10/24 1419   BP: 136/83   Pulse: 92     Physical Exam  Constitutional:       General: She is not in acute distress.     Appearance: Normal appearance.   HENT:      Head: Normocephalic and atraumatic.   Eyes:      Extraocular Movements: Extraocular movements intact.   Cardiovascular:      Rate and Rhythm: Normal rate.      Pulses: Normal pulses.   Pulmonary:      Effort: Pulmonary effort is normal. No respiratory distress.   Abdominal:       "Tenderness: There is no abdominal tenderness. There is no guarding.   Musculoskeletal:         General: Normal range of motion.      Cervical back: Normal range of motion.   Neurological:      General: No focal deficit present.      Mental Status: She is alert. Mental status is at baseline.   Skin:     General: Skin is warm and dry.   Psychiatric:         Mood and Affect: Mood normal.         Behavior: Behavior normal.   Vitals reviewed.          ASSESSMENT AND PLAN    18 y.o., , with /83 (BP Location: Right arm, Patient Position: Sitting, Cuff Size: Standard)   Pulse 92   Ht 5' 1\" (1.549 m)   Wt 65 kg (143 lb 3.2 oz)   LMP 2024 (Exact Date) , at 12w4d here for her prenatal H&P.    FHT 130s by doppler    Pregnancy: H&P completed today. PN Labs still need to be completed Labor expectations discussed with patient, including appointment schedule, nutrition, exercise, medications, sexual intercourse, and nausea/vomiting.     Low Dose ASA: indicated 2/2 nulliparity and family history of preeclampsia    Weight gain: Patient's BMI is 27. Recommended weight gain is 15-25lbs.     Screening: Pap smear not indicated. GC/CT collected. Genetic screening reviewed with patient - will proceed with NIPT.     Depression Screening Follow-up Plan: Patient's depression screening was negative with a PHQ-2 score of 0. Their PHQ-9 score was 0. Clinically patient does not have depression. No treatment is required.    Consents: Delivery process including potential OVD and  reviewed. Sign delivery consent form at 28 weeks.    Contraception: Different methods of contraception were discussed with patient, including progesterone only oral pills, depo provera, nexplanon, mirena, and paragard. Patient would like to use progesterone-only pills during postpartum phase.    Follow up: RTC in 4 weeks. Precautions regarding labor, leakage, bleeding, and fetal movement reviewed.    Anuel Lowrey MD  4/10/2024  2:28 " PM

## 2024-04-11 LAB
C TRACH DNA SPEC QL NAA+PROBE: NEGATIVE
N GONORRHOEA DNA SPEC QL NAA+PROBE: NEGATIVE

## 2024-04-15 ENCOUNTER — TELEPHONE (OUTPATIENT)
Age: 19
End: 2024-04-15

## 2024-04-17 ENCOUNTER — TELEPHONE (OUTPATIENT)
Age: 19
End: 2024-04-17

## 2024-04-22 ENCOUNTER — TELEPHONE (OUTPATIENT)
Age: 19
End: 2024-04-22

## 2024-05-02 ENCOUNTER — TELEPHONE (OUTPATIENT)
Dept: OBGYN CLINIC | Facility: CLINIC | Age: 19
End: 2024-05-02

## 2024-05-02 NOTE — TELEPHONE ENCOUNTER
----- Message from ANA LAURA Pena sent at 4/24/2024  7:49 AM EDT -----  Regarding: RE: Unable to reach patient MFM referral  FYI  ----- Message -----  From: Sadia Heredia  Sent: 4/22/2024   2:42 PM EDT  To: ANA LAURA Pena  Subject: Unable to reach patient MFM referral             FYI -  We reached out to this pt and left a message three times.  We were unable to schedule the pt for an appointment as indicated in your referral.    [facility] Maternal Fetal Medicine

## 2024-05-08 ENCOUNTER — TELEPHONE (OUTPATIENT)
Dept: OBGYN CLINIC | Facility: CLINIC | Age: 19
End: 2024-05-08

## 2024-05-14 ENCOUNTER — ROUTINE PRENATAL (OUTPATIENT)
Dept: OBGYN CLINIC | Facility: CLINIC | Age: 19
End: 2024-05-14

## 2024-05-14 ENCOUNTER — APPOINTMENT (OUTPATIENT)
Dept: LAB | Facility: HOSPITAL | Age: 19
End: 2024-05-14
Payer: COMMERCIAL

## 2024-05-14 ENCOUNTER — HOSPITAL ENCOUNTER (OUTPATIENT)
Dept: RADIOLOGY | Facility: HOSPITAL | Age: 19
End: 2024-05-14
Payer: COMMERCIAL

## 2024-05-14 VITALS
SYSTOLIC BLOOD PRESSURE: 124 MMHG | DIASTOLIC BLOOD PRESSURE: 69 MMHG | HEIGHT: 61 IN | BODY MASS INDEX: 27.34 KG/M2 | WEIGHT: 144.8 LBS | HEART RATE: 80 BPM

## 2024-05-14 DIAGNOSIS — Z13.79 GENETIC SCREENING: ICD-10-CM

## 2024-05-14 DIAGNOSIS — Z34.92 PRENATAL CARE IN SECOND TRIMESTER: Primary | ICD-10-CM

## 2024-05-14 DIAGNOSIS — Z31.430 ENCOUNTER OF FEMALE FOR TESTING FOR GENETIC DISEASE CARRIER STATUS FOR PROCREATIVE MANAGEMENT: ICD-10-CM

## 2024-05-14 DIAGNOSIS — Z3A.17 17 WEEKS GESTATION OF PREGNANCY: ICD-10-CM

## 2024-05-14 DIAGNOSIS — Z34.91 PRENATAL CARE IN FIRST TRIMESTER: ICD-10-CM

## 2024-05-14 DIAGNOSIS — Z3A.09 9 WEEKS GESTATION OF PREGNANCY: ICD-10-CM

## 2024-05-14 DIAGNOSIS — Z34.92 PRENATAL CARE IN SECOND TRIMESTER: ICD-10-CM

## 2024-05-14 LAB
BACTERIA UR QL AUTO: ABNORMAL /HPF
BILIRUB UR QL STRIP: NEGATIVE
CLARITY UR: CLEAR
COLOR UR: ABNORMAL
GLUCOSE UR STRIP-MCNC: NEGATIVE MG/DL
HGB UR QL STRIP.AUTO: NEGATIVE
KETONES UR STRIP-MCNC: NEGATIVE MG/DL
LEUKOCYTE ESTERASE UR QL STRIP: NEGATIVE
MUCOUS THREADS UR QL AUTO: ABNORMAL
NITRITE UR QL STRIP: NEGATIVE
NON-SQ EPI CELLS URNS QL MICRO: ABNORMAL /HPF
PH UR STRIP.AUTO: 6 [PH]
PROT UR STRIP-MCNC: ABNORMAL MG/DL
RBC #/AREA URNS AUTO: ABNORMAL /HPF
RUBV IGG SERPL IA-ACNC: 69 IU/ML
SP GR UR STRIP.AUTO: 1.02 (ref 1–1.04)
UROBILINOGEN UA: NEGATIVE MG/DL
WBC #/AREA URNS AUTO: ABNORMAL /HPF

## 2024-05-14 PROCEDURE — 86706 HEP B SURFACE ANTIBODY: CPT

## 2024-05-14 PROCEDURE — 87340 HEPATITIS B SURFACE AG IA: CPT

## 2024-05-14 PROCEDURE — 87389 HIV-1 AG W/HIV-1&-2 AB AG IA: CPT

## 2024-05-14 PROCEDURE — 86780 TREPONEMA PALLIDUM: CPT

## 2024-05-14 PROCEDURE — 99214 OFFICE O/P EST MOD 30 MIN: CPT | Performed by: NURSE PRACTITIONER

## 2024-05-14 PROCEDURE — 81001 URINALYSIS AUTO W/SCOPE: CPT

## 2024-05-14 PROCEDURE — 86803 HEPATITIS C AB TEST: CPT

## 2024-05-14 PROCEDURE — 36415 COLL VENOUS BLD VENIPUNCTURE: CPT

## 2024-05-14 PROCEDURE — 86762 RUBELLA ANTIBODY: CPT

## 2024-05-14 PROCEDURE — 87086 URINE CULTURE/COLONY COUNT: CPT

## 2024-05-14 NOTE — PROGRESS NOTES
"Assessment & Plan  18 y.o.  at 17w3d presenting for routine prenatal visit.   Advised to complete second part of genetic screening and prenatal lab work previously ordered  Pt has appointment or level II U/S      Problem List Items Addressed This Visit          Obstetrics/Gynecology    17 weeks gestation of pregnancy    Relevant Orders    Alpha fetoprotein, maternal     Other Visit Diagnoses       Prenatal care in second trimester    -  Primary    Relevant Orders    Alpha fetoprotein, maternal    Genetic screening        Relevant Orders    Alpha fetoprotein, maternal          ____________________________________________________________  Subjective  She is without complaint.   She denies contractions, loss of fluid, or vaginal bleeding.   She does not feels regular fetal movements.     WNL respiratory effort, negative cough or SOB    Objective  /69 (BP Location: Left arm, Patient Position: Sitting, Cuff Size: Standard)   Pulse 80   Ht 5' 1\" (1.549 m)   Wt 65.7 kg (144 lb 12.8 oz)   LMP 2024 (Exact Date)   BMI 27.36 kg/m²          Patient's Active Problem List  Patient Active Problem List   Diagnosis    17 weeks gestation of pregnancy    History of migraine headaches     Plan  Keep appointment for Level II U/S  Complete lab work as directed  To call with vaginal bleeding, loss of fluid, regular contractions, other needs or concerns.  Return in 4 weeks  Pt verbalized understanding of all discussed.    "

## 2024-05-14 NOTE — PATIENT INSTRUCTIONS
WARNING SIGNS DURING PREGNANCY  Call our office at [phone] for any of the followin. Vaginal bleeding  2. Sharp abdominal pain that does not go away  3. Fever (more than 100.4 and is not relieved by Tylenol)  4. Persistent vomiting lasting greater than 24 hours  5. Chest pain   6. Pain or burning when you urinate  7. Severe headache that doesn't resolve with Tylenol  8. Blurred vision or seeing spots in your vision  9. Sudden swelling of your face or hands  10. Redness, swelling or pain in a leg  11. A sudden weight gain in just a few days  12. Decrease in your baby's movement (after 28 weeks or the 6th month of pregnancy)  13. A loss of watery fluid from your vagina - can be a gush, a trickle or continuous wetness  14. After 20 weeks of pregnancy, rhythmic cramping (greater than 4 per hour) or menstrual like low/pelvic pain     Complete second part of genetic screening and prenatal lab work  Keep appointment for level Ii U/S  Return in 4 weeks

## 2024-05-15 LAB
BACTERIA UR CULT: NORMAL
HBV SURFACE AG SER QL: NORMAL
HCV AB SER QL: NORMAL
HIV 1+2 AB+HIV1 P24 AG SERPL QL IA: NORMAL
HIV 2 AB SERPL QL IA: NORMAL
HIV1 AB SERPL QL IA: NORMAL
HIV1 P24 AG SERPL QL IA: NORMAL
TREPONEMA PALLIDUM IGG+IGM AB [PRESENCE] IN SERUM OR PLASMA BY IMMUNOASSAY: NORMAL

## 2024-05-16 LAB — HBV SURFACE AB SER-ACNC: 10.3 MIU/ML

## 2024-06-10 PROBLEM — Z3A.21 21 WEEKS GESTATION OF PREGNANCY: Status: ACTIVE | Noted: 2024-04-10

## 2024-06-12 ENCOUNTER — ROUTINE PRENATAL (OUTPATIENT)
Age: 19
End: 2024-06-12
Payer: COMMERCIAL

## 2024-06-12 VITALS
DIASTOLIC BLOOD PRESSURE: 64 MMHG | BODY MASS INDEX: 29 KG/M2 | WEIGHT: 153.6 LBS | SYSTOLIC BLOOD PRESSURE: 128 MMHG | HEIGHT: 61 IN | HEART RATE: 99 BPM

## 2024-06-12 DIAGNOSIS — Z36.3 ENCOUNTER FOR ANTENATAL SCREENING FOR MALFORMATION: Primary | ICD-10-CM

## 2024-06-12 DIAGNOSIS — R03.0 ELEVATED BLOOD PRESSURE READING WITHOUT DIAGNOSIS OF HYPERTENSION: ICD-10-CM

## 2024-06-12 DIAGNOSIS — Z34.92 PRENATAL CARE IN SECOND TRIMESTER: ICD-10-CM

## 2024-06-12 DIAGNOSIS — Z3A.21 21 WEEKS GESTATION OF PREGNANCY: ICD-10-CM

## 2024-06-12 DIAGNOSIS — Z36.86 ENCOUNTER FOR ANTENATAL SCREENING FOR CERVICAL LENGTH: ICD-10-CM

## 2024-06-12 PROCEDURE — 99243 OFF/OP CNSLTJ NEW/EST LOW 30: CPT | Performed by: OBSTETRICS & GYNECOLOGY

## 2024-06-12 PROCEDURE — 76817 TRANSVAGINAL US OBSTETRIC: CPT | Performed by: OBSTETRICS & GYNECOLOGY

## 2024-06-12 PROCEDURE — 76805 OB US >/= 14 WKS SNGL FETUS: CPT | Performed by: OBSTETRICS & GYNECOLOGY

## 2024-06-12 NOTE — PROGRESS NOTES
OFFICE CONSULT  Igor Pena  [address] [place] [address]     Dear Edwin Del Castillo     Thank you for requesting a  consultation on your patient Brian Guthrie for the following indications: Fetal anatomy.  Before today's visit she has not had any genetic screening completed.  Her MSAFP was not completed.  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  She had 1 blood pressure of 140/73 noted in pregnancy at 9 weeks without a diagnosis of hypertension.  Her OB office recommended that she start on aspirin 162 mg daily to decrease her risk for developing preeclampsia.  Past surgical history: None  Medications: Prenatal vitamins, Reglan that she uses as needed for migraine and aspirin 162 mg daily that she has not started taking by today's visit  Allergies to medications: Penicillin causes hives and she is unaware of her exact reaction to amoxicillin  Past obstetrical history:  1 para 0  Social history: Reports no substance use  First generation family history: Her sister has diabetes    Ultrasound findings: Her ultrasound today shows a fetus that is growing concordant with her dates.  Cervical length appears normal.  HAYDEE appears normal.  No malformations are detected.    The patient was informed of the findings and counseled about the limitations of the exam in detecting all forms of fetal congenital abnormalities.    She does not report any vaginal bleeding or uterine cramping/contractions. She does feel fetal movement.      Specific counseling was provided on the following problems:  1.  I offered genetic screening.  Based on her age and normal ultrasound I recommended cell free DNA screening.  She is interested in this blood  work and would like to have the kit to have drawn with the rest of the blood work she is getting drawn today.  2.  Encouraged her to start her aspirin daily till 36 weeks to decrease her risk of developing preeclampsia as she has multiple risk factors which include her first pregnancy and 1 elevated blood pressure noted in pregnancy.  Should she have any further elevated blood pressures recommend baseline preeclamptic labs.    Follow up recommended:   No further follow-up ultrasounds are recommended at this time.  Encouraged her to complete the rest of her prenatal lab work ordered through her OB office as soon as possible.    Pre visit time reviewing her records  10 minutes  Face to face time 15 minutes  Post visit time on documentation of note, updating her problem list, adding orders and prescriptions 10 minutes.  Procedures that were completed today were charged separately.   The level of decision making was low level complexity.    Rosalia Del Castillo MD

## 2024-06-12 NOTE — LETTER
2024     ANA LAURA Pena  [address] [address]    Patient: Brian Guthrie   YOB: 2005   Date of Visit: 2024       Dear Dr. Del Castillo:    Thank you for referring Brian Guthrie to me for evaluation. Below are my notes for this consultation.    If you have questions, please do not hesitate to call me. I look forward to following your patient along with you.         Sincerely,        Rosalia Del Castillo MD        CC: No Recipients    Rosalia Del Castillo MD  2024  9:42 AM  Sign when Signing Visit  OFFICE CONSULT  Ana Laura Pena  [address] [place] [id number]     Dear Edwin Del Castillo     Thank you for requesting a  consultation on your patient Brian Guthrie for the following indications: Fetal anatomy.  Today's visit she has not had any genetic screening completed.  Her MSAFP was not completed.  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  She had 1 blood pressure of 140/73 noted in pregnancy at 9 weeks without a diagnosis of hypertension.  Her OB office recommended that she start on aspirin to decrease her risk for developing preeclampsia.  Past surgical history: None  Medications: Prenatal vitamins, Reglan that she uses as needed for migraine and aspirin 162 mg daily that she has not started taken by today's visit  Allergies to medications: Penicillin causes hives and amoxicillin  Past obstetrical history:  1 para 0  Social history: Reports no substance use  First generation family history: Her sister has diabetes    Ultrasound findings: Her ultrasound today shows a fetus that is growing concordant with her dates.  Cervical length appears normal.  HAYDEE appears normal.   No malformations are detected.    The patient was informed of the findings and counseled about the limitations of the exam in detecting all forms of fetal congenital abnormalities.    She does not report any vaginal bleeding or uterine cramping/contractions. She does feel fetal movement.      Specific counseling was provided on the following problems:  1.  I offered genetic screening.  Based on her age and normal ultrasound I recommended cell free DNA screening.  She is interested in this blood work and would like to have the kit to have drawn with the rest of the blood work she is getting drawn today.  2.  Encouraged her to start her aspirin daily till 36 weeks to decrease her risk of developing preeclampsia as she has multiple risk factors which include her first pregnancy and 1 elevated blood pressure noted in pregnancy.  Should she have any further elevated blood pressures recommend baseline preeclamptic labs.    Follow up recommended:   No further follow-up ultrasounds are recommended at this time.    Pre visit time reviewing her records  10 minutes  Face to face time 15 minutes  Post visit time on documentation of note, updating her problem list, adding orders and prescriptions 10 minutes.  Procedures that were completed today were charged separately.   The level of decision making was low level complexity.    Rosalia Del Castillo MD

## 2024-06-12 NOTE — LETTER
2024     ANA LAURA Pena  [address] [facility] [address]    Patient: Brian Guthrie   YOB: 2005   Date of Visit: 2024       Dear Dr. Del Castillo:    Thank you for referring Brian Guthrie to me for evaluation. Below are my notes for this consultation.    If you have questions, please do not hesitate to call me. I look forward to following your patient along with you.         Sincerely,        Rosalia Del Castillo MD        CC: No Recipients    Rosalia Del Castillo MD  2024  7:09 PM  Sign when Signing Visit  OFFICE CONSULT  Ana Laura Pena  [address] [place] [id number]     Dear Edwin Del Castillo     Thank you for requesting a  consultation on your patient Brian Guthrie for the following indications: Fetal anatomy.  Before today's visit she has not had any genetic screening completed.  Her MSAFP was not completed.  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  She had 1 blood pressure of 140/73 noted in pregnancy at 9 weeks without a diagnosis of hypertension.  Her OB office recommended that she start on aspirin 162 mg daily to decrease her risk for developing preeclampsia.  Past surgical history: None  Medications: Prenatal vitamins, Reglan that she uses as needed for migraine and aspirin 162 mg daily that she has not started taking by today's visit  Allergies to medications: Penicillin causes hives and she is unaware of her exact reaction to amoxicillin  Past obstetrical history:  1 para 0  Social history: Reports no substance use  First generation family history: Her sister has diabetes    Ultrasound findings: Her ultrasound today shows a fetus that is growing concordant with her  dates.  Cervical length appears normal.  HAYDEE appears normal.  No malformations are detected.    The patient was informed of the findings and counseled about the limitations of the exam in detecting all forms of fetal congenital abnormalities.    She does not report any vaginal bleeding or uterine cramping/contractions. She does feel fetal movement.      Specific counseling was provided on the following problems:  1.  I offered genetic screening.  Based on her age and normal ultrasound I recommended cell free DNA screening.  She is interested in this blood work and would like to have the kit to have drawn with the rest of the blood work she is getting drawn today.  2.  Encouraged her to start her aspirin daily till 36 weeks to decrease her risk of developing preeclampsia as she has multiple risk factors which include her first pregnancy and 1 elevated blood pressure noted in pregnancy.  Should she have any further elevated blood pressures recommend baseline preeclamptic labs.    Follow up recommended:   No further follow-up ultrasounds are recommended at this time.  Encouraged her to complete the rest of her prenatal lab work ordered through her OB office as soon as possible.    Pre visit time reviewing her records  10 minutes  Face to face time 15 minutes  Post visit time on documentation of note, updating her problem list, adding orders and prescriptions 10 minutes.  Procedures that were completed today were charged separately.   The level of decision making was low level complexity.    Rosalia Del Castillo MD

## 2024-06-12 NOTE — PROGRESS NOTES
Ultrasound Probe Disinfection    A transvaginal ultrasound was performed.   Prior to use, disinfection was performed with High Level Disinfection Process ([employer]).  Probe serial number S1: [id number] was used.    Chaperone: Jaja Florez, Medical Assistant  August Alvarez RDMS

## 2024-06-12 NOTE — PROGRESS NOTES
Patient chose to have [facility] [id number] Non-Invasive Prenatal Screen [id number] PLUS w/ SCA, WITH fetal sex.  Patient choose to be billed through insurance.     Patient given brochure and is aware [facility] will contact patient's insurance and coordinate coverage.  Provided [facility] contact information. General inquiries [phone], Cost estimates [phone] and [facility] Billing [phone]. Website [employer].     Blood collection tubes labeled with patient identifiers (name, medical record number, and date of birth).     Filled out [facility] order form. Patient chose to be sent to an outpatient lab to complete blood work. .      If patient chose to have blood work drawn at a [facility] lab we requested patient notify MFM (via phone call or iRewind message) when blood collected so office can follow up on results.       Maternal Fetal Medicine will have results in approximately 5-7 business days and will call patient or notify via iRewind.  Patient aware viewing lab result online will reveal fetal sex if ordered.    Patient verbalized understanding of all instructions and no questions at this time.

## 2024-06-26 ENCOUNTER — TELEPHONE (OUTPATIENT)
Dept: OBGYN CLINIC | Facility: CLINIC | Age: 19
End: 2024-06-26

## 2024-07-11 ENCOUNTER — APPOINTMENT (OUTPATIENT)
Dept: LAB | Facility: HOSPITAL | Age: 19
End: 2024-07-11
Payer: COMMERCIAL

## 2024-07-11 DIAGNOSIS — Z3A.09 9 WEEKS GESTATION OF PREGNANCY: ICD-10-CM

## 2024-07-11 DIAGNOSIS — Z34.91 PRENATAL CARE IN FIRST TRIMESTER: ICD-10-CM

## 2024-07-11 DIAGNOSIS — Z13.79 GENETIC SCREENING: ICD-10-CM

## 2024-07-11 DIAGNOSIS — Z3A.17 17 WEEKS GESTATION OF PREGNANCY: ICD-10-CM

## 2024-07-11 DIAGNOSIS — Z31.430 ENCOUNTER OF FEMALE FOR TESTING FOR GENETIC DISEASE CARRIER STATUS FOR PROCREATIVE MANAGEMENT: ICD-10-CM

## 2024-07-11 DIAGNOSIS — Z34.92 PRENATAL CARE IN SECOND TRIMESTER: ICD-10-CM

## 2024-07-11 LAB
ABO GROUP BLD: NORMAL
BASOPHILS # BLD AUTO: 0.07 THOUSANDS/ΜL (ref 0–0.1)
BASOPHILS NFR BLD AUTO: 1 % (ref 0–1)
BLD GP AB SCN SERPL QL: NEGATIVE
EOSINOPHIL # BLD AUTO: 0.13 THOUSAND/ΜL (ref 0–0.61)
EOSINOPHIL NFR BLD AUTO: 2 % (ref 0–6)
ERYTHROCYTE [DISTWIDTH] IN BLOOD BY AUTOMATED COUNT: 12.6 % (ref 11.6–15.1)
GLUCOSE 1H P 50 G GLC PO SERPL-MCNC: 63 MG/DL (ref 70–134)
HCT VFR BLD AUTO: 33.4 % (ref 34.8–46.1)
HGB BLD-MCNC: 11.6 G/DL (ref 11.5–15.4)
IMM GRANULOCYTES # BLD AUTO: 0.04 THOUSAND/UL (ref 0–0.2)
IMM GRANULOCYTES NFR BLD AUTO: 1 % (ref 0–2)
LYMPHOCYTES # BLD AUTO: 1.56 THOUSANDS/ΜL (ref 0.6–4.47)
LYMPHOCYTES NFR BLD AUTO: 22 % (ref 14–44)
MCH RBC QN AUTO: 30.8 PG (ref 26.8–34.3)
MCHC RBC AUTO-ENTMCNC: 34.7 G/DL (ref 31.4–37.4)
MCV RBC AUTO: 89 FL (ref 82–98)
MONOCYTES # BLD AUTO: 0.54 THOUSAND/ΜL (ref 0.17–1.22)
MONOCYTES NFR BLD AUTO: 8 % (ref 4–12)
NEUTROPHILS # BLD AUTO: 4.67 THOUSANDS/ΜL (ref 1.85–7.62)
NEUTS SEG NFR BLD AUTO: 66 % (ref 43–75)
NRBC BLD AUTO-RTO: 0 /100 WBCS
PLATELET # BLD AUTO: 304 THOUSANDS/UL (ref 149–390)
PMV BLD AUTO: 10.4 FL (ref 8.9–12.7)
RBC # BLD AUTO: 3.77 MILLION/UL (ref 3.81–5.12)
RH BLD: POSITIVE
SPECIMEN EXPIRATION DATE: NORMAL
WBC # BLD AUTO: 7.01 THOUSAND/UL (ref 4.31–10.16)

## 2024-07-11 PROCEDURE — 86900 BLOOD TYPING SEROLOGIC ABO: CPT

## 2024-07-11 PROCEDURE — 85025 COMPLETE CBC W/AUTO DIFF WBC: CPT

## 2024-07-11 PROCEDURE — 86901 BLOOD TYPING SEROLOGIC RH(D): CPT

## 2024-07-11 PROCEDURE — 82105 ALPHA-FETOPROTEIN SERUM: CPT

## 2024-07-11 PROCEDURE — 86850 RBC ANTIBODY SCREEN: CPT

## 2024-07-11 PROCEDURE — 82950 GLUCOSE TEST: CPT

## 2024-07-11 PROCEDURE — 83020 HEMOGLOBIN ELECTROPHORESIS: CPT

## 2024-07-11 PROCEDURE — 36415 COLL VENOUS BLD VENIPUNCTURE: CPT

## 2024-07-12 ENCOUNTER — ROUTINE PRENATAL (OUTPATIENT)
Dept: OBGYN CLINIC | Facility: CLINIC | Age: 19
End: 2024-07-12

## 2024-07-12 VITALS
SYSTOLIC BLOOD PRESSURE: 119 MMHG | BODY MASS INDEX: 30.13 KG/M2 | HEART RATE: 72 BPM | HEIGHT: 61 IN | WEIGHT: 159.6 LBS | DIASTOLIC BLOOD PRESSURE: 78 MMHG

## 2024-07-12 DIAGNOSIS — Z34.92 PRENATAL CARE IN SECOND TRIMESTER: Primary | ICD-10-CM

## 2024-07-12 DIAGNOSIS — Z3A.25 25 WEEKS GESTATION OF PREGNANCY: ICD-10-CM

## 2024-07-12 PROCEDURE — 99214 OFFICE O/P EST MOD 30 MIN: CPT | Performed by: NURSE PRACTITIONER

## 2024-07-12 NOTE — PROGRESS NOTES
"Assessment & Plan  18 y.o.  at 25w6d presenting for routine prenatal visit.   Given slip for 28 week labs, advised to complete in 3 weeks prior to next visit    Problem List Items Addressed This Visit          Obstetrics/Gynecology    25 weeks gestation of pregnancy    Relevant Orders    Type and screen    RPR-Syphilis Screening (Total Syphilis IGG/IGM)    Glucose, 1H PG    CBC and differential     Other Visit Diagnoses       Prenatal care in second trimester    -  Primary    Relevant Orders    Type and screen    RPR-Syphilis Screening (Total Syphilis IGG/IGM)    Glucose, 1H PG    CBC and differential          ____________________________________________________________  Subjective  She is without complaint.   She denies contractions, loss of fluid, or vaginal bleeding.   She feels regular fetal movements.     WNL respiratory effort, negative cough or SOB    Objective  /78 (BP Location: Left arm, Patient Position: Sitting, Cuff Size: Standard)   Pulse 72   Ht 5' 1\" (1.549 m)   Wt 72.4 kg (159 lb 9.6 oz)   LMP 2024 (Exact Date)   BMI 30.16 kg/m²          Patient's Active Problem List  Patient Active Problem List   Diagnosis    25 weeks gestation of pregnancy    History of migraine headaches    Elevated blood pressure reading without diagnosis of hypertension       Plan  Complete lab work prior to next visit  To call with vaginal bleeding, loss of fluid, regular contractions, decreased fetal movement, other needs or concerns.  Return in 3 weeks  Pt verbalized understanding of all discussed.    "

## 2024-07-12 NOTE — PATIENT INSTRUCTIONS
The Third Trimester  (28-42 weeks)  YOUR BABY   * your baby sucks its thumb now!   * your baby can hear voices and respond to touch…..so talk to him or her!!   * your baby’s brain grows and develops most in the last 2 months of pregnancy   * baby’s head and bones are soft and flexible so they can fit through the birth canal   * baby’s movements change towards the end of pregnancy because there is less room for kicking and stretching in your belly   * baby’s lungs are not fully developed and completely ready to breathe on their own until the last 3-4 weeks before your due date    YOUR BODY   * your belly is growing a lot now   * it may become more difficult to sleep well at night or to be as active as you usually are   * you may sweat more than usual   * you will become more off-balance……be careful not to fall!!   * you may develop hemorrhoids (which can be painful and make it difficult to sit down)   * the last two months of pregnancy can become very uncomfortable……with backaches, headaches, and heartburn   * you can start to have contractions…….as long as they are irregular and less than 5 per hour, this is a normal part of your body getting ready to have a baby   * your cervix may start to thin out and open up……to get ready for delivery   * you may find yourself needing to “pee” very often…….because baby is pressing on your bladder so much   * you may get out of breathe more quickly than usual      FETAL KICK COUNTS    In the third trimester (after 28 weeks gestation) you should be performing fetal kick counts every day.  Your baby should move at least 10 times in 2 hours during an active time, once a day.    Choose atime of day when your baby is most active.  Try to do this around the same time each day.  Get into a comfortable position and then write down the time your baby first moves.  Count each movement until the baby moves 10 times.  These movements include kicks, punches, nudges, flutters, or rolls.  This  can take anywhere from 5 minutes to 2 hours.  Write down the time you feel the baby's 10th movement.    If 2 hours has passed and your baby has not moved at least 10 times, you should CALL THE OFFICE RIGHT AWAY.  [phone].          PREMATURE LABOR     When to call [phone]:  * I need to call immediately if I have even a small amount of LIQUID leaking from my vagina, with or without contractions.   * I need to call if I am BLEEDING from my vagina.   * I need to call if I am feeling CRAMPING that continues after drinking 2-3 glasses of water and lying down on my side for one hour and that feels like I am having a period.   * I need to call if I feel CONTRACTIONS  more than 4 times in an hour that feels like the baby is “balling up” even after I try drinking 2-3 glasses of water and lying down on my side for an hour.   * I need to call if I notice a change in my vaginal DISCHARGE.   * I need to call if I am feeling PELVIC PRESSURE  that feels like the baby is pushing down into my vagina and lasts more than an hour.   * I need to call if I have LOW BACKACHE which is new and near my tailbone.  It may either come and go several times during an hour or stay there constantly.          PRE-ECLAMPSIA     What is it?   Pre-eclampsia is a serious disease that can occur during pregnancy related to high blood pressures.  It can happen to any woman.     Why should I care?   Women who develop pre-eclampsia have serious risks which can include seizures, stroke, organ damage, premature birth of their baby.  In the very worst cases, it can cause death of the mother and/or their baby.     What should I pay attention to?   Signs and symptoms of pre-eclampsia can include:   * Severe swelling of face or hands    * A headache that will not go away even after you have taken Tylenol   * Seeing spots or changes in eyesight    * Pain in the upper abdomen or shoulder    * New nausea and vomiting (in the second half of pregnancy)    *  Sudden weight gain    * Difficulty breathing     What should I do?   If you experience any of the above symptoms of pre-eclampsia, contact your OB provider.  Finding pre-eclampsia early is important for you and your baby.  Call us at [phone]..      BREASTFEEDING     BENEFITS FOR BABIES   * stronger immune systems (less allergies, eczema, asthma, and childhood cancers)   * less diarrhea and constipation or other GI diseases   * fewer colds and ear infections   * better vision and teeth (fewer cavities)   * improves IQ   * lower rates of diabetes and obesity in childhood     BENEFITS FOR MOMS   * promotes faster weight loss after delivery   * lower risk for postpartum depression   * lower risk for breast, uterine, and ovarian cancers   * lower risk for osteoporosis developing with age   * easier than formula - is always right with you, clean, and the right temperature   * less expensive than formula……it’s FREE !!!!     KEYS TO SUCCESSFUL BREASTFEEDING   * keep baby skin-to-skin until after first feeding event   * keep baby in your room with you during your hospital stay after delivery   * avoid any bottle feedings (unless medically necessary)   * limit the use of pacifiers and swaddling   * ask for help if you are having any issues……lactation consultants (who specialize in breastfeeding) are available to help you   * a healthy diet for mom……eating a variety of foods and portions in moderation    THINGS YOU SHOULD KNOW ABOUT BREASTFEEDING   * most medications are considered compatible with breastfeeding by the American Academy of Pediatrics, but you should check with your health care provider or lactation consultant prior to taking a new medication……just to be sure it is safe   * alcohol (beer, wine, liquor) can be passed from mother to baby through breast milk……an occasional, social drink is deemed acceptable by the American Academy of Pediatrics…..more than that should be avoided   * breastfeeding is NOT an  effective method of birth control   * nicotine (in cigarettes) can pass from mother to baby through breast milk…..however, for mothers who smoke, it is still healthier to breastfeed than use formula   * caffeine should be limited to 1-3 cups per day……includes coffee, soda, energy drinks         PERINEAL / VAGINAL MASSAGE    What can I do now to decrease my chances of tearing during delivery?  Massaging around the vaginal opening by you (or your partner), either antepartum (before birth) or during the second stage of labor, can reduce the likelihood of perineal tearing during childbirth.  Likewise, the use of warm packs held on the perineum during the pushing stage of labor can reduce the severity of your tear.  This will happen during the pushing stage of labor.  At home, you can also help reduce the chances of injury that may occur during the birth of your child through perineal massage.    When should I do this?  Starting around or shortly after 34 weeks of pregnancy, you or your partner should provide 5-10 minutes of vaginal massage 1-4 times per week.    How?  Use either almond, coconut, or olive oil and water mixture on 1 or 2 fingers (depending on comfort).  Insert finger(s) 3-5cm into the vagina.  Apply sweeping downward/sideward pressure from 3 to 9 o'clock for 5-10 minutes, 1-4 times per week.          WARNING SIGNS DURING PREGNANCY  Call our office at [phone] if you experience any of the followin. Vaginal bleeding  2. Sharp abdominal pain that does not go away  3. Fever (more than 100.4 and is not relieved by Tylenol)  4. Persistent vomiting lasting greater than 24 hours  5. Chest pain   6. Pain or burning when you urinate  7. Severe headache that doesn't resolve with Tylenol  8. Blurred vision or seeing spots in your vision  9. Sudden swelling of your face or hands  10. Redness, swelling or pain in a leg  11. A sudden weight gain in just a few days  12. Decrease in your baby's movement (after  28 weeks or the 6th month of pregnancy)  13. A loss of watery fluid from your vagina - can be a gush, a trickle or continuous wetness  14. After 20 weeks of pregnancy, rhythmic cramping (greater than 4 per hour) or menstrual like low/pelvic pain          VACCINES IN PREGNANCY    TDAP  Whooping cough (or pertussis) can be serious for anyone, but for your , it can be life-threatning.  Up to 20 babies die each year in the [place] Due to whooping cough.  About half of babies younger than 1 year old who get whooping cough need treatment in the hospital.  The younger the baby is when he or she gets whooping cough, the more likely he or she will need to be treated in a hospital.  When you receive the whooping cough vaccine (Tdap) during your pregnancy, your body will create protective antibodies and pass some of them to your baby before birth.  These antibodies can help protect your baby from getting whooping cough until they are old enough to be vaccinated themselves (usually around 6 months of age).    INFLUENZA  Changes in your immune, heart, and lung functions during pregnancy make you more likely to get seriously ill from the flu.  Catching the flu also increases your chances for serious problems for your developing baby, including premature labor and delivery.  It is recommended that all women who are pregnant during flu season should receive an influenza vaccine.    Complete lab work prior to next visit  Return in 3 weeks

## 2024-07-16 LAB
2ND TRIMESTER 4 SCREEN SERPL-IMP: NORMAL
AFP ADJ MOM SERPL: NORMAL
AFP INTERP AMN-IMP: NORMAL
AFP INTERP SERPL-IMP: NORMAL
AFP INTERP SERPL-IMP: NORMAL
AFP SERPL-MCNC: 408.9 NG/ML
AGE AT DELIVERY: 20 YR
GA METHOD: NORMAL
GA: 25.7 WEEKS
HGB A MFR BLD: 2.5 % (ref 1.8–3.2)
HGB A MFR BLD: 97.5 % (ref 96.4–98.8)
HGB F MFR BLD: 0 % (ref 0–2)
HGB FRACT BLD-IMP: NORMAL
HGB S MFR BLD: 0 %
IDDM PATIENT QL: NO
MULTIPLE PREGNANCY: NO
NEURAL TUBE DEFECT RISK FETUS: NORMAL %

## 2024-07-17 ENCOUNTER — PATIENT MESSAGE (OUTPATIENT)
Dept: OBGYN CLINIC | Facility: CLINIC | Age: 19
End: 2024-07-17

## 2024-07-31 PROBLEM — Z3A.28 28 WEEKS GESTATION OF PREGNANCY: Status: ACTIVE | Noted: 2024-04-10

## 2024-08-01 ENCOUNTER — ROUTINE PRENATAL (OUTPATIENT)
Dept: OBGYN CLINIC | Facility: CLINIC | Age: 19
End: 2024-08-01

## 2024-08-01 VITALS
HEART RATE: 73 BPM | WEIGHT: 158.8 LBS | DIASTOLIC BLOOD PRESSURE: 74 MMHG | HEIGHT: 61 IN | BODY MASS INDEX: 29.98 KG/M2 | SYSTOLIC BLOOD PRESSURE: 119 MMHG

## 2024-08-01 DIAGNOSIS — Z23 ENCOUNTER FOR IMMUNIZATION: ICD-10-CM

## 2024-08-01 DIAGNOSIS — Z3A.28 28 WEEKS GESTATION OF PREGNANCY: Primary | ICD-10-CM

## 2024-08-01 LAB
DME PARACHUTE DELIVERY DATE REQUESTED: NORMAL
DME PARACHUTE ITEM DESCRIPTION: NORMAL
DME PARACHUTE ORDER STATUS: NORMAL
DME PARACHUTE SUPPLIER NAME: NORMAL
DME PARACHUTE SUPPLIER PHONE: NORMAL

## 2024-08-01 PROCEDURE — 90715 TDAP VACCINE 7 YRS/> IM: CPT | Performed by: OBSTETRICS & GYNECOLOGY

## 2024-08-01 PROCEDURE — 90471 IMMUNIZATION ADMIN: CPT | Performed by: OBSTETRICS & GYNECOLOGY

## 2024-08-01 PROCEDURE — 99214 OFFICE O/P EST MOD 30 MIN: CPT | Performed by: OBSTETRICS & GYNECOLOGY

## 2024-08-01 NOTE — PROGRESS NOTES
OB/GYN Prenatal Visit    ASSESSMENT / PLAN:  1. 28 weeks gestation of pregnancy  Assessment & Plan:  Prenatal visit completed today. Patient feels well.    Labs  Patient instructed to complete prenatal labs  Vaccines:  Flu: will offer during flu season  Tdap: administered today,   Birth plan:  with epidural  Feeding plan: breast and formula supplement  Contraception: POPs    Return to clinic in 2 weeks  2. Encounter for immunization  -     Tdap Vaccine greater than or equal to 6yo    SUBJECTIVE:  Brian Guthrie is a 18 y.o.  at 28w5d here for prenatal visit. PMH is significant for migraines and asthma. She has not needed to use an inhaler during this pregnancy. This pregnancy is complicated by one elevated blood pressure reading without diagnosis of HTN. Today she reports she has been taking her prescribed aspirin.     She has no obstetric complaints. She denies pelvic pain, cramping/contractions, vaginal bleeding, loss of fluid, or decreased fetal movement. She denies headaches, visual disturbances, or RUQ pain.    OBJECTIVE:  Vitals:    24 1409   BP: 119/74   Pulse: 73     FHT: 150 bpm  Fundal height: 30 cm    Physical Exam:  General: Well appearing, no acute distress  Cardiovascular: Regular rate  Respiratory: Unlabored breathing on room air  Abdomen: Soft, gravid, nontender    Patient seen and examined with attending physician Dr. Karin Eden MD  OBGYN PGY-1  24  2:43 PM

## 2024-08-01 NOTE — ASSESSMENT & PLAN NOTE
Prenatal visit completed today. Patient feels well.    Labs  Patient instructed to complete prenatal labs  Vaccines:  Flu: will offer during flu season  Tdap: administered today,   Birth plan:  with epidural  Feeding plan: breast and formula supplement  Contraception: POPs    Return to clinic in 2 weeks

## 2024-08-01 NOTE — PATIENT INSTRUCTIONS
Patient Education     Pregnancy - The Seventh Month   About this topic   It is important for you to learn how to take care of yourself to help you have a healthy baby and safe delivery. It is good to have health care throughout your pregnancy.  The seventh month of your pregnancy starts around week 29 and lasts through week 32. By knowing how far along you are, you can learn what is normal for this stage of your pregnancy and plan for what is next.  General   Your body   During the seventh month of your pregnancy, here are some things you can expect.  You may:  Have weight gain of about 15 to 20 pounds (6.8 to 9 kg) total in your first 7 months.  Have more trouble moving about and sleeping as the baby gets bigger  Have very vivid dreams  Notice more vaginal discharge  Notice a creamy, watery substance leaking from your nipples  Need a shot called Rh immune globulin if your blood type may be different from your baby's  Your baby's growth and development:  Your baby is gaining weight and adding layers of fat.  Your baby turns to be head down, into the position for delivery.  They are able to respond to loud noises and to dream.  Your baby moves at least 10 times in 2 hours. If your baby isn't moving this much, talk to your doctor right away.  Your baby is about 16 inches (42 cm) long and weighs about 4 pounds (1,800 gm). Your baby is about the size of squash.  Things to Think About   Avoid alcohol, drugs, tobacco products, and second hand smoke  Think about what you want your baby's birth to be like. Who do you want with you?  Find out about what lactation services are covered by your insurance.  Look into lactation consultants and breastfeeding classes.  Where do you want to deliver? It’s time to make a plan for labor and delivery.  Plan on getting a car seat and other things for your baby.  Talk to your doctor if you plan to travel or get on a plane.  When do I need to call the doctor?   Contractions every 10  minutes or more often that do not go away with drinking water or position changes.  Low, dull back pain that does not go away  Feeling unusually dizzy or tired  Pressure in your pelvis that feels like your baby is pushing down  A gush or constant trickle of watery or bloody fluid leaking from your vagina  Cramps in your lower belly that come and go or are constant  Little to no movement felt by baby in 2 hours. Your baby should be moving at least 10 times every 2 hours.  Headache that does not go away; blurry vision; seeing spots or halos; increase in swelling in your hands, feet, or face; and pain under your ribs on the right side  Vaginal bleeding with or without pain  Fever of 100.4°F (38°C) or higher  After a car accident, fall, or any trauma to your belly  Having thoughts of harming yourself or others, or do not feel safe at home  Last Reviewed Date   2020-05-06  Consumer Information Use and Disclaimer   This generalized information is a limited summary of diagnosis, treatment, and/or medication information. It is not meant to be comprehensive and should be used as a tool to help the user understand and/or assess potential diagnostic and treatment options. It does NOT include all information about conditions, treatments, medications, side effects, or risks that may apply to a specific patient. It is not intended to be medical advice or a substitute for the medical advice, diagnosis, or treatment of a health care provider based on the health care provider's examination and assessment of a patient’s specific and unique circumstances. Patients must speak with a health care provider for complete information about their health, medical questions, and treatment options, including any risks or benefits regarding use of medications. This information does not endorse any treatments or medications as safe, effective, or approved for treating a specific patient. [employer] and its affiliates disclaim any warranty or  liability relating to this information or the use thereof. The use of this information is governed by the Terms of Use, available at https://www.wolterskluwer.com/en/know/clinical-effectiveness-terms   Copyright   Copyright © 2024 [employer] and its affiliates and/or licensors. All rights reserved.

## 2024-08-01 NOTE — PROGRESS NOTES
Patient given t-dap on left deltoid on 8/1/24  28 week teaching and ordered breast pump    NDC# 51795-006-93  LOT# [id number]  EXP# 04/2026

## 2024-08-14 ENCOUNTER — APPOINTMENT (OUTPATIENT)
Dept: LAB | Facility: HOSPITAL | Age: 19
End: 2024-08-14
Payer: COMMERCIAL

## 2024-08-14 DIAGNOSIS — Z3A.25 25 WEEKS GESTATION OF PREGNANCY: ICD-10-CM

## 2024-08-14 DIAGNOSIS — Z34.92 PRENATAL CARE IN SECOND TRIMESTER: ICD-10-CM

## 2024-08-14 LAB
ABO GROUP BLD: NORMAL
BASOPHILS # BLD AUTO: 0.04 THOUSANDS/ÂΜL (ref 0–0.1)
BASOPHILS NFR BLD AUTO: 1 % (ref 0–1)
BLD GP AB SCN SERPL QL: NEGATIVE
EOSINOPHIL # BLD AUTO: 0.13 THOUSAND/ÂΜL (ref 0–0.61)
EOSINOPHIL NFR BLD AUTO: 2 % (ref 0–6)
ERYTHROCYTE [DISTWIDTH] IN BLOOD BY AUTOMATED COUNT: 12.1 % (ref 11.6–15.1)
HCT VFR BLD AUTO: 31.1 % (ref 34.8–46.1)
HGB BLD-MCNC: 11.1 G/DL (ref 11.5–15.4)
IMM GRANULOCYTES # BLD AUTO: 0.06 THOUSAND/UL (ref 0–0.2)
IMM GRANULOCYTES NFR BLD AUTO: 1 % (ref 0–2)
LYMPHOCYTES # BLD AUTO: 1.67 THOUSANDS/ÂΜL (ref 0.6–4.47)
LYMPHOCYTES NFR BLD AUTO: 19 % (ref 14–44)
MCH RBC QN AUTO: 30.5 PG (ref 26.8–34.3)
MCHC RBC AUTO-ENTMCNC: 35.7 G/DL (ref 31.4–37.4)
MCV RBC AUTO: 85 FL (ref 82–98)
MONOCYTES # BLD AUTO: 0.58 THOUSAND/ÂΜL (ref 0.17–1.22)
MONOCYTES NFR BLD AUTO: 7 % (ref 4–12)
NEUTROPHILS # BLD AUTO: 6.25 THOUSANDS/ÂΜL (ref 1.85–7.62)
NEUTS SEG NFR BLD AUTO: 70 % (ref 43–75)
NRBC BLD AUTO-RTO: 0 /100 WBCS
PLATELET # BLD AUTO: 293 THOUSANDS/UL (ref 149–390)
PMV BLD AUTO: 10 FL (ref 8.9–12.7)
RBC # BLD AUTO: 3.64 MILLION/UL (ref 3.81–5.12)
RH BLD: POSITIVE
SPECIMEN EXPIRATION DATE: NORMAL
WBC # BLD AUTO: 8.73 THOUSAND/UL (ref 4.31–10.16)

## 2024-08-14 PROCEDURE — 86901 BLOOD TYPING SEROLOGIC RH(D): CPT

## 2024-08-14 PROCEDURE — 86900 BLOOD TYPING SEROLOGIC ABO: CPT

## 2024-08-14 PROCEDURE — 86780 TREPONEMA PALLIDUM: CPT

## 2024-08-14 PROCEDURE — 86850 RBC ANTIBODY SCREEN: CPT

## 2024-08-15 LAB — TREPONEMA PALLIDUM IGG+IGM AB [PRESENCE] IN SERUM OR PLASMA BY IMMUNOASSAY: NORMAL

## 2024-08-16 ENCOUNTER — ROUTINE PRENATAL (OUTPATIENT)
Dept: OBGYN CLINIC | Facility: CLINIC | Age: 19
End: 2024-08-16

## 2024-08-16 VITALS
DIASTOLIC BLOOD PRESSURE: 75 MMHG | WEIGHT: 163.4 LBS | BODY MASS INDEX: 30.85 KG/M2 | HEIGHT: 61 IN | HEART RATE: 80 BPM | SYSTOLIC BLOOD PRESSURE: 118 MMHG

## 2024-08-16 DIAGNOSIS — Z3A.30 30 WEEKS GESTATION OF PREGNANCY: Primary | ICD-10-CM

## 2024-08-16 PROCEDURE — 99213 OFFICE O/P EST LOW 20 MIN: CPT | Performed by: FAMILY MEDICINE

## 2024-08-16 NOTE — PROGRESS NOTES
Brian Guthrie presents today for routine OB visit at 30w6d.  Blood Pressure: 118/75  Wt=74.1 kg (163 lb 6.4 oz); Body mass index is 30.87 kg/m².; TWG=12.9 kg (28 lb 6.4 oz)  Fetal Heart Rate: 147; Fundal Height (cm): 30 cm  Abdomen: gravid, soft, non-tender.  She reports no concerns at this time .  Denies uterine contractions.  Denies vaginal bleeding or leaking of fluid.  Reports adequate fetal movement of at least 10 movements in 2 hours once daily.  Reviewed premature labor precautions and fetal kick counts.  Advised to continue medications and return in 2 weeks.      Current Outpatient Medications   Medication Instructions    aspirin 162 mg, Oral, Daily    Prenatal Vit-Fe Fumarate-FA (Prenatal Vitamin) 27-0.8 MG TABS 1 tablet, Oral, Daily         G1 Problems (from 24 to present)       Problem Noted Resolved    28 weeks gestation of pregnancy 4/10/2024 by Anuel Lowery MD No    Overview Addendum 2024  2:43 PM by Michael Eden MD     Initial:  Pre-gravid BMI:  Recommended weight gain:  CBC:  Type/screen:  Pap:  NT:  NIPT:  Carrier screen:  SMA:  UA + cx:  Rubella titer:  Syphilis:  GCCT:  HBsAg:  HIV:  Aspirin indicated:  Contraception: POPs  IPV screen:  Birth plan:  with epidural  Feeding plan: breast + formula supplement    15-20 weeks:  Triple/quad:  MSAFP:  UA:  Flu vaccine:  Anatomy scan (18-20wks):    24-28 weeks:  CBC (28wks):  1hr GTT (28wks):  RPR (28wks):  UA:  Tdap (27-36wks): given  Flu vaccine:  Rhogam given: n/a  Fetal kick counts reviewed:  Delivery counseling completed: done  Delivery consent signed (28wks):  Ma31 signed:    29-41 weeks:  CBC:  UA:  GCCT:  GBS (36-38wks):  NST indicated:  Fetal presentation (36wks):

## 2024-08-30 LAB
DME PARACHUTE DELIVERY DATE ACTUAL: NORMAL
DME PARACHUTE DELIVERY DATE REQUESTED: NORMAL
DME PARACHUTE ITEM DESCRIPTION: NORMAL
DME PARACHUTE ORDER STATUS: NORMAL
DME PARACHUTE SUPPLIER NAME: NORMAL
DME PARACHUTE SUPPLIER PHONE: NORMAL

## 2024-09-04 ENCOUNTER — TELEPHONE (OUTPATIENT)
Dept: OBGYN CLINIC | Facility: CLINIC | Age: 19
End: 2024-09-04

## 2024-09-18 ENCOUNTER — ROUTINE PRENATAL (OUTPATIENT)
Dept: OBGYN CLINIC | Facility: CLINIC | Age: 19
End: 2024-09-18

## 2024-09-18 ENCOUNTER — APPOINTMENT (OUTPATIENT)
Dept: LAB | Facility: HOSPITAL | Age: 19
End: 2024-09-18
Payer: COMMERCIAL

## 2024-09-18 VITALS
HEIGHT: 61 IN | HEART RATE: 90 BPM | SYSTOLIC BLOOD PRESSURE: 124 MMHG | WEIGHT: 171 LBS | DIASTOLIC BLOOD PRESSURE: 79 MMHG | BODY MASS INDEX: 32.28 KG/M2

## 2024-09-18 DIAGNOSIS — Z3A.35 35 WEEKS GESTATION OF PREGNANCY: ICD-10-CM

## 2024-09-18 DIAGNOSIS — Z34.93 PRENATAL CARE IN THIRD TRIMESTER: Primary | ICD-10-CM

## 2024-09-18 PROBLEM — Z3A.28 28 WEEKS GESTATION OF PREGNANCY: Status: RESOLVED | Noted: 2024-04-10 | Resolved: 2024-09-18

## 2024-09-18 LAB — GLUCOSE 1H P 50 G GLC PO SERPL-MCNC: 74 MG/DL (ref 70–134)

## 2024-09-18 PROCEDURE — 99213 OFFICE O/P EST LOW 20 MIN: CPT | Performed by: NURSE PRACTITIONER

## 2024-09-18 NOTE — PATIENT INSTRUCTIONS
Thank you for your confidence in our team.   We appreciate you and welcome your feedback.   If you receive a survey from us, please take a few moments to let us know how we are doing.   Sincerely,  ANA LAURA Mon       The Third Trimester  (28-42 weeks)  YOUR BABY   * your baby sucks its thumb now!   * your baby can hear voices and respond to touch…..so talk to him or her!!   * your baby’s brain grows and develops most in the last 2 months of pregnancy   * baby’s head and bones are soft and flexible so they can fit through the birth canal   * baby’s movements change towards the end of pregnancy because there is less room for kicking and stretching in your belly   * baby’s lungs are not fully developed and completely ready to breathe on their own until the last 3-4 weeks before your due date    YOUR BODY   * your belly is growing a lot now   * it may become more difficult to sleep well at night or to be as active as you usually are   * you may sweat more than usual   * you will become more off-balance……be careful not to fall!!   * you may develop hemorrhoids (which can be painful and make it difficult to sit down)   * the last two months of pregnancy can become very uncomfortable……with backaches, headaches, and heartburn   * you can start to have contractions…….as long as they are irregular and less than 5 per hour, this is a normal part of your body getting ready to have a baby   * your cervix may start to thin out and open up……to get ready for delivery   * you may find yourself needing to “pee” very often…….because baby is pressing on your bladder so much   * you may get out of breathe more quickly than usual      FETAL KICK COUNTS    In the third trimester (after 28 weeks gestation) you should be performing fetal kick counts every day.  Your baby should move at least 10 times in 2 hours during an active time, once a day.    Choose atime of day when your baby is most active.  Try to do this around the  same time each day.  Get into a comfortable position and then write down the time your baby first moves.  Count each movement until the baby moves 10 times.  These movements include kicks, punches, nudges, flutters, or rolls.  This can take anywhere from 5 minutes to 2 hours.  Write down the time you feel the baby's 10th movement.    If 2 hours has passed and your baby has not moved at least 10 times, you should CALL THE OFFICE RIGHT AWAY.  [phone].          PREMATURE LABOR     When to call [phone]:  * I need to call immediately if I have even a small amount of LIQUID leaking from my vagina, with or without contractions.   * I need to call if I am BLEEDING from my vagina.   * I need to call if I am feeling CRAMPING that continues after drinking 2-3 glasses of water and lying down on my side for one hour and that feels like I am having a period.   * I need to call if I feel CONTRACTIONS  more than 4 times in an hour that feels like the baby is “balling up” even after I try drinking 2-3 glasses of water and lying down on my side for an hour.   * I need to call if I notice a change in my vaginal DISCHARGE.   * I need to call if I am feeling PELVIC PRESSURE  that feels like the baby is pushing down into my vagina and lasts more than an hour.   * I need to call if I have LOW BACKACHE which is new and near my tailbone.  It may either come and go several times during an hour or stay there constantly.          PRE-ECLAMPSIA     What is it?   Pre-eclampsia is a serious disease that can occur during pregnancy related to high blood pressures.  It can happen to any woman.     Why should I care?   Women who develop pre-eclampsia have serious risks which can include seizures, stroke, organ damage, premature birth of their baby.  In the very worst cases, it can cause death of the mother and/or their baby.     What should I pay attention to?   Signs and symptoms of pre-eclampsia can include:   * Severe swelling of face or  hands    * A headache that will not go away even after you have taken Tylenol   * Seeing spots or changes in eyesight    * Pain in the upper abdomen or shoulder    * New nausea and vomiting (in the second half of pregnancy)    * Sudden weight gain    * Difficulty breathing     What should I do?   If you experience any of the above symptoms of pre-eclampsia, contact your OB provider.  Finding pre-eclampsia early is important for you and your baby.  Call us at [phone]..      BREASTFEEDING     BENEFITS FOR BABIES   * stronger immune systems (less allergies, eczema, asthma, and childhood cancers)   * less diarrhea and constipation or other GI diseases   * fewer colds and ear infections   * better vision and teeth (fewer cavities)   * improves IQ   * lower rates of diabetes and obesity in childhood     BENEFITS FOR MOMS   * promotes faster weight loss after delivery   * lower risk for postpartum depression   * lower risk for breast, uterine, and ovarian cancers   * lower risk for osteoporosis developing with age   * easier than formula - is always right with you, clean, and the right temperature   * less expensive than formula……it’s FREE !!!!     KEYS TO SUCCESSFUL BREASTFEEDING   * keep baby skin-to-skin until after first feeding event   * keep baby in your room with you during your hospital stay after delivery   * avoid any bottle feedings (unless medically necessary)   * limit the use of pacifiers and swaddling   * ask for help if you are having any issues……lactation consultants (who specialize in breastfeeding) are available to help you   * a healthy diet for mom……eating a variety of foods and portions in moderation    THINGS YOU SHOULD KNOW ABOUT BREASTFEEDING   * most medications are considered compatible with breastfeeding by the American Academy of Pediatrics, but you should check with your health care provider or lactation consultant prior to taking a new medication……just to be sure it is safe   * alcohol  (beer, wine, liquor) can be passed from mother to baby through breast milk……an occasional, social drink is deemed acceptable by the American Academy of Pediatrics…..more than that should be avoided   * breastfeeding is NOT an effective method of birth control   * nicotine (in cigarettes) can pass from mother to baby through breast milk…..however, for mothers who smoke, it is still healthier to breastfeed than use formula   * caffeine should be limited to 1-3 cups per day……includes coffee, soda, energy drinks         PERINEAL / VAGINAL MASSAGE    What can I do now to decrease my chances of tearing during delivery?  Massaging around the vaginal opening by you (or your partner), either antepartum (before birth) or during the second stage of labor, can reduce the likelihood of perineal tearing during childbirth.  Likewise, the use of warm packs held on the perineum during the pushing stage of labor can reduce the severity of your tear.  This will happen during the pushing stage of labor.  At home, you can also help reduce the chances of injury that may occur during the birth of your child through perineal massage.    When should I do this?  Starting around or shortly after 34 weeks of pregnancy, you or your partner should provide 5-10 minutes of vaginal massage 1-4 times per week.    How?  Use either almond, coconut, or olive oil and water mixture on 1 or 2 fingers (depending on comfort).  Insert finger(s) 3-5cm into the vagina.  Apply sweeping downward/sideward pressure from 3 to 9 o'clock for 5-10 minutes, 1-4 times per week.          WARNING SIGNS DURING PREGNANCY  Call our office at [phone] if you experience any of the followin. Vaginal bleeding  2. Sharp abdominal pain that does not go away  3. Fever (more than 100.4 and is not relieved by Tylenol)  4. Persistent vomiting lasting greater than 24 hours  5. Chest pain   6. Pain or burning when you urinate  7. Severe headache that doesn't resolve with  Tylenol  8. Blurred vision or seeing spots in your vision  9. Sudden swelling of your face or hands  10. Redness, swelling or pain in a leg  11. A sudden weight gain in just a few days  12. Decrease in your baby's movement (after 28 weeks or the 6th month of pregnancy)  13. A loss of watery fluid from your vagina - can be a gush, a trickle or continuous wetness  14. After 20 weeks of pregnancy, rhythmic cramping (greater than 4 per hour) or menstrual like low/pelvic pain          VACCINES IN PREGNANCY    TDAP  Whooping cough (or pertussis) can be serious for anyone, but for your , it can be life-threatning.  Up to 20 babies die each year in the [place] Due to whooping cough.  About half of babies younger than 1 year old who get whooping cough need treatment in the hospital.  The younger the baby is when he or she gets whooping cough, the more likely he or she will need to be treated in a hospital.  When you receive the whooping cough vaccine (Tdap) during your pregnancy, your body will create protective antibodies and pass some of them to your baby before birth.  These antibodies can help protect your baby from getting whooping cough until they are old enough to be vaccinated themselves (usually around 6 months of age).    INFLUENZA  Changes in your immune, heart, and lung functions during pregnancy make you more likely to get seriously ill from the flu.  Catching the flu also increases your chances for serious problems for your developing baby, including premature labor and delivery.  It is recommended that all women who are pregnant during flu season should receive an influenza vaccine.

## 2024-09-18 NOTE — PROGRESS NOTES
Brian Guthire presents today for routine OB visit at 35w4d.  Blood Pressure: 124/79  Wt=77.6 kg (171 lb); Body mass index is 32.31 kg/m².; TWG=16.3 kg (36 lb)  Fetal Heart Rate: 144; Fundal Height (cm): 35 cm  Abdomen: gravid, soft, non-tender.  She reports no complaints.  Denies uterine contractions.  Denies vaginal bleeding or leaking of fluid.  Reports adequate fetal movement of at least 10 movements in 2 hours once daily.  Reviewed premature labor precautions and fetal kick counts.  Advised to continue medications and return in 1 week.      Current Outpatient Medications   Medication Instructions    aspirin 162 mg, Oral, Daily    Prenatal Vit-Fe Fumarate-FA (Prenatal Vitamin) 27-0.8 MG TABS 1 tablet, Oral, Daily

## 2024-09-27 ENCOUNTER — ROUTINE PRENATAL (OUTPATIENT)
Dept: OBGYN CLINIC | Facility: CLINIC | Age: 19
End: 2024-09-27

## 2024-09-27 VITALS
SYSTOLIC BLOOD PRESSURE: 129 MMHG | WEIGHT: 177.6 LBS | HEART RATE: 82 BPM | DIASTOLIC BLOOD PRESSURE: 84 MMHG | HEIGHT: 61 IN | BODY MASS INDEX: 33.53 KG/M2

## 2024-09-27 DIAGNOSIS — Z3A.36 36 WEEKS GESTATION OF PREGNANCY: Primary | ICD-10-CM

## 2024-09-27 PROCEDURE — 99213 OFFICE O/P EST LOW 20 MIN: CPT | Performed by: FAMILY MEDICINE

## 2024-09-27 PROCEDURE — 87150 DNA/RNA AMPLIFIED PROBE: CPT

## 2024-09-27 NOTE — PROGRESS NOTES
Brian Guthrie presents today for routine OB visit at 36w6d.  She reports ***.  Denies uterine contractions or persistent cramping.***  Denies vaginal bleeding or leaking of fluid.***  Reports fetal movement.***  Scheduled for next ultrasound ***.  Genetic screening plans: ***  Reviewed common discomforts of pregnancy in second trimester and warning signs.  Advised to continue prenatal vitamins and return in *** weeks.    G1 Problems (from 24 to present)       Problem Noted Resolved    36 weeks gestation of pregnancy 2024 by Mili Schneider MD No    Overview Signed 2024  8:19 AM by Mili Schneider MD     Initial:  Pre-gravid BMI:  Recommended weight gain:  CBC:  Type/screen:  Pap:  NT:  NIPT:  Carrier screen:  SMA:  UA + cx:  Rubella titer:  Syphilis:  GCCT:  HBsAg:  HIV:  Aspirin indicated:  Contraception: POPs  IPV screen:  Birth plan:  with epidural  Feeding plan: breast + formula supplement     15-20 weeks:  Triple/quad:  MSAFP:  UA:  Flu vaccine:  Anatomy scan (18-20wks):     24-28 weeks:  CBC (28wks):  1hr GTT (28wks):  RPR (28wks):  UA:  Tdap (27-36wks): given 2024  Flu vaccine:  Rhogam given: n/a  Fetal kick counts reviewed:  Delivery counseling completed: done  Delivery consent signed (28wks):  Ma31 signed:     29-41 weeks:  CBC:  UA:  GCCT:  GBS (36-38wks):  NST indicated:  Fetal presentation (36wks):                     36 weeks: collect GBS/PCN allergy?, flu vaccine  37 weeks: review perineal massage/health calls.  38 weeks: IOL  39 weeks: Strip membranes.  40 weeks: NST or baby time    Mili Schneider MD  2024  8:20 AM

## 2024-09-27 NOTE — PROGRESS NOTES
Brian Guthrie presents today for routine OB visit at 36w6d.  Fundal height: 37 cm   FHR: 138   She reports no complaints.  Denies uterine contractions or persistent cramping.  Denies vaginal bleeding or leaking of fluid.  Reports fetal movement.  Scheduled for next ultrasound none recommended. Last done on 2024.  Genetic screening plans: Done 3/18/2024.  Reviewed common discomforts of pregnancy in second trimester and warning signs.  Advised to continue prenatal vitamins and return in 1 weeks.    G1 Problems (from 24 to present)       Problem Noted Resolved    36 weeks gestation of pregnancy 2024 by Mili Schneider MD No    Overview Addendum 2024  9:31 AM by Mili Schneider MD     HIV:  Aspirin indicated:  Contraception: POPs  IPV screen:  Birth plan:  with epidural  Feeding plan: breast + formula supplement     15-20 weeks:  MSAFP: 2024  UA: Done 2024  Anatomy scan (18-20wks): Done 2024     24-28 weeks:  CBC (28wks): 2024 Done  1hr GTT (28wks): 2024 Done  RPR (28wks): Done  UA: Done 2024  Tdap (27-36wks): given- 2024  Rhogam given: n/a  Fetal kick counts reviewed:  Delivery counseling completed: done     29-41 weeks:  GBS (36-38wks): Done-2024  Flu vaccine:               Mili Schneider MD  2024  10:24 AM

## 2024-09-30 LAB — GP B STREP DNA SPEC QL NAA+PROBE: NEGATIVE

## 2024-10-02 ENCOUNTER — ROUTINE PRENATAL (OUTPATIENT)
Dept: OBGYN CLINIC | Facility: CLINIC | Age: 19
End: 2024-10-02

## 2024-10-02 VITALS
DIASTOLIC BLOOD PRESSURE: 85 MMHG | SYSTOLIC BLOOD PRESSURE: 132 MMHG | HEART RATE: 77 BPM | WEIGHT: 181.8 LBS | BODY MASS INDEX: 34.35 KG/M2

## 2024-10-02 DIAGNOSIS — Z3A.37 37 WEEKS GESTATION OF PREGNANCY: ICD-10-CM

## 2024-10-02 DIAGNOSIS — Z34.93 PRENATAL CARE IN THIRD TRIMESTER: Primary | ICD-10-CM

## 2024-10-02 DIAGNOSIS — Z86.69 HISTORY OF MIGRAINE HEADACHES: ICD-10-CM

## 2024-10-02 PROCEDURE — 99215 OFFICE O/P EST HI 40 MIN: CPT | Performed by: NURSE PRACTITIONER

## 2024-10-02 RX ORDER — CYPROHEPTADINE HYDROCHLORIDE 4 MG/1
4 TABLET ORAL 2 TIMES DAILY PRN
Qty: 60 TABLET | Refills: 1 | Status: SHIPPED | OUTPATIENT
Start: 2024-10-02

## 2024-10-02 NOTE — PROGRESS NOTES
Assessment & Plan  19 y.o.  at 37w4d presenting for routine prenatal visit.   GBS was negative  Pt C/O increased H/A not relieved with Tylenol, pt states she has a Hx of migraine H/A  BP was 132/85, safe and effective use of Cyproheptadine was provided    Problem List Items Addressed This Visit          Obstetrics/Gynecology    37 weeks gestation of pregnancy       Neurology/Sleep    History of migraine headaches     Other Visit Diagnoses       Prenatal care in third trimester    -  Primary          ____________________________________________________________  Subjective  She is without complaint.   She denies contractions, loss of fluid, or vaginal bleeding.   She feels regular fetal movements.     WNL respiratory effort, negative cough or SOB    Objective  /85 (BP Location: Right arm, Patient Position: Sitting, Cuff Size: Standard)   Pulse 77   Wt 82.5 kg (181 lb 12.8 oz)   LMP 2024 (Exact Date)   BMI 34.35 kg/m²          Patient's Active Problem List  Patient Active Problem List   Diagnosis    History of migraine headaches    Elevated blood pressure reading without diagnosis of hypertension    37 weeks gestation of pregnancy   Plan  Cyproheptadine as directed, call with increased H/A  To call with vaginal bleeding, loss of fluid, regular contractions, decreased fetal movement, other needs or concerns.  Return in 1 week  Pt verbalized understanding of all discussed.

## 2024-10-02 NOTE — PATIENT INSTRUCTIONS
LABOR PRECAUTIONS  Call our office at [phone] for any of the following:    * I need to call immediately I if I have even a small amount of LIQUID  leaking from my vagina, with or without contractions.   * I need to call if I am BLEEDING an amount equal to or more than a period.  A small amount of bloody vaginal discharge is normal at the end of the pregnancy.   * I need to call if I am having CONTRACTIONS  every five minutes for at least an hour.  I will need a watch in order to time them.  I should time them from the beginning of one contraction until the beginning of the next one.   * I need to call BEFORE  I go to the hospital.   * I need to have a plan for TRANSPORTATION  to get to the hospital when I am in labor.  Labor is generally not an emergency which requires an ambulance.          FETAL KICK COUNTS    In the third trimester (after 28 weeks gestation) you should be performing fetal kick counts every day.  Your baby should move at least 10 times in 2 hours during an active time, once a day.    Choose atime of day when your baby is most active.  Try to do this around the same time each day.  Get into a comfortable position and then write down the time your baby first moves.  Count each movement until the baby moves 10 times.  These movements include kicks, punches, nudges, flutters, or rolls.  This can take anywhere from 5 minutes to 2 hours.  Write down the time you feel the baby's 10th movement.    If 2 hours has passed and your baby has not moved at least 10 times, you should CALL THE OFFICE RIGHT AWAY.  [phone]        PERINEAL / VAGINAL MASSAGE    What can I do now to decrease my chances of tearing during delivery?  Massaging around the vaginal opening by you (or your partner), either antepartum (before birth) or during the second stage of labor, can reduce the likelihood of perineal tearing during childbirth.  Likewise, the use of warm packs held on the perineum during the pushing stage of  labor can reduce the severity of your tear.  This will happen during the pushing stage of labor.  At home, you can also help reduce the chances of injury that may occur during the birth of your child through perineal massage.    When should I do this?  Starting around or shortly after 34 weeks of pregnancy, you or your partner should provide 5-10 minutes of vaginal massage 1-4 times per week.    How?  Use either almond, coconut, or olive oil and water mixture on 1 or 2 fingers (depending on comfort).  Insert finger(s) 3-5cm into the vagina.  Apply sweeping downward/sideward pressure from 3 to 9 o'clock for 5-10 minutes, 1-4 times per week.        GROUP B STREP    Group B Strep (GBS) is a common vaginal bacteria.  Approximately 25% of women normally have GBS bacteria present in the vagina.  It is NOT a sexually-transmitted infection.  In fact, it is not an infection AT ALL!  It is just a normal vaginal bacteria for many women.    However, the GBS bacteria can be dangerous to a  baby if the baby is exposed to that particular bacteria during labor and birth AND develops an infection from it.  The likelihood of a  GBS infection for a woman who has GBS bacteria in the vagina is about 1%-2%.  But if it does occur, a baby could become severely ill.    For this reason, we do a vaginal culture (Q-tip swab of the vagina and rectum) for ALL pregnant women at approximately 36 weeks of pregnancy.  If the culture shows that there is GBS bacteria present, it is NOT a reason to panic!  Because in this situation we will give this woman antibiotics through her IV while she is in labor.  When a mother is treated with antibiotics during labor and delivery, her baby ALMOST NEVER becomes infected with GBS bacteria.     Take Cyproheptadine 4 mg prior to breakfast and t bedtime for headaches, call with increased headache  Return in 1 week

## 2024-10-08 ENCOUNTER — ROUTINE PRENATAL (OUTPATIENT)
Dept: OBGYN CLINIC | Facility: CLINIC | Age: 19
End: 2024-10-08

## 2024-10-08 VITALS
WEIGHT: 185.8 LBS | SYSTOLIC BLOOD PRESSURE: 128 MMHG | BODY MASS INDEX: 35.08 KG/M2 | DIASTOLIC BLOOD PRESSURE: 84 MMHG | HEART RATE: 72 BPM | HEIGHT: 61 IN

## 2024-10-08 DIAGNOSIS — Z34.93 PRENATAL CARE IN THIRD TRIMESTER: Primary | ICD-10-CM

## 2024-10-08 DIAGNOSIS — Z3A.37 37 WEEKS GESTATION OF PREGNANCY: ICD-10-CM

## 2024-10-08 PROCEDURE — 90656 IIV3 VACC NO PRSV 0.5 ML IM: CPT | Performed by: OBSTETRICS & GYNECOLOGY

## 2024-10-08 PROCEDURE — 99213 OFFICE O/P EST LOW 20 MIN: CPT | Performed by: OBSTETRICS & GYNECOLOGY

## 2024-10-08 PROCEDURE — 90471 IMMUNIZATION ADMIN: CPT | Performed by: OBSTETRICS & GYNECOLOGY

## 2024-10-08 NOTE — PROGRESS NOTES
[facility]YN  PRENATAL VISIT  Name: Brian Guthrie  MRN: [id number]  : 2005      ASSESSMENT/PLAN:  Problem List       History of migraine headaches    Overview     Reglan PRN         Elevated blood pressure reading without diagnosis of hypertension    Overview     140/73 at 9 weeks.  Should she have any other elevated blood pressures recommend baseline preeclamptic labs.  She was started on aspirin.  Denies any family history of hypertension or preeclampsia.         37 weeks gestation of pregnancy    Overview     HIV:  Aspirin indicated:  Contraception: POPs  IPV screen:  Birth plan:  with epidural  Feeding plan: breast + formula supplement     15-20 weeks:  Triple/quad:  MSAFP: 2024  UA: Done  Flu vaccine:  Anatomy scan (18-20wks):     24-28 weeks:  CBC (28wks): 2024 Done  1hr GTT (28wks): 2024 Done  RPR (28wks): Done  UA: Done 2024  Tdap (27-36wks): given- 2024  Rhogam given: n/a  Fetal kick counts reviewed:  Delivery counseling completed: done  Delivery consent signed 10/8/24     29-41 weeks:  GBS (36-38wks): negative  Flu vaccine: given          Other Visit Diagnoses       Prenatal care in third trimester    -  Primary            SUBJECTIVE 19 y.o.  @ 38w3d here for PN visit. She denies contractions. She denies leakage of fluid and vaginal bleeding. She endorses good fetal movement. Her pregnancy is uncomplicated.       OBJECTIVE:  Vitals:    10/08/24 1435   BP: 128/84   Pulse: 72       Fundal height: size = dates  FHT: 140s by doppler       Future Appointments   Date Time Provider Department Center   10/14/2024 10:00 AM ANA LAURA Pena  [facility]         Anuel Lowery MD  OB/GYN PGY-3  10/8/2024  2:59 PM

## 2024-10-08 NOTE — PROGRESS NOTES
Flu given to patient in right deltoid on 10/8/2024.    NDC: 80561-572-08  LOT: [id number]  EXP: 6/30/2025

## 2024-10-14 ENCOUNTER — ROUTINE PRENATAL (OUTPATIENT)
Dept: OBGYN CLINIC | Facility: CLINIC | Age: 19
End: 2024-10-14

## 2024-10-14 ENCOUNTER — HOSPITAL ENCOUNTER (INPATIENT)
Facility: HOSPITAL | Age: 19
LOS: 4 days | Discharge: HOME | DRG: 560 | End: 2024-10-18
Attending: OBSTETRICS & GYNECOLOGY | Admitting: OBSTETRICS & GYNECOLOGY
Payer: COMMERCIAL

## 2024-10-14 VITALS
SYSTOLIC BLOOD PRESSURE: 136 MMHG | WEIGHT: 180.8 LBS | HEART RATE: 71 BPM | DIASTOLIC BLOOD PRESSURE: 91 MMHG | BODY MASS INDEX: 34.16 KG/M2

## 2024-10-14 DIAGNOSIS — Z3A.39 39 WEEKS GESTATION OF PREGNANCY: ICD-10-CM

## 2024-10-14 DIAGNOSIS — Z34.93 PRENATAL CARE IN THIRD TRIMESTER: Primary | ICD-10-CM

## 2024-10-14 DIAGNOSIS — O14.93 PRE-ECLAMPSIA IN THIRD TRIMESTER: ICD-10-CM

## 2024-10-14 DIAGNOSIS — Z37.9 VACUUM-ASSISTED VAGINAL DELIVERY: Primary | ICD-10-CM

## 2024-10-14 PROBLEM — Z34.90 ENCOUNTER FOR INDUCTION OF LABOR: Status: ACTIVE | Noted: 2024-10-14

## 2024-10-14 LAB
ABO GROUP BLD: NORMAL
ALBUMIN SERPL BCG-MCNC: 3.5 G/DL (ref 3.5–5)
ALBUMIN SERPL BCG-MCNC: 3.6 G/DL (ref 3.5–5)
ALP SERPL-CCNC: 245 U/L (ref 34–104)
ALP SERPL-CCNC: 245 U/L (ref 34–104)
ALT SERPL W P-5'-P-CCNC: 110 U/L (ref 7–52)
ALT SERPL W P-5'-P-CCNC: 116 U/L (ref 7–52)
ANION GAP SERPL CALCULATED.3IONS-SCNC: 8 MMOL/L (ref 4–13)
ANION GAP SERPL CALCULATED.3IONS-SCNC: 8 MMOL/L (ref 4–13)
AST SERPL W P-5'-P-CCNC: 102 U/L (ref 13–39)
AST SERPL W P-5'-P-CCNC: 89 U/L (ref 13–39)
BASOPHILS # BLD AUTO: 0.05 THOUSANDS/ΜL (ref 0–0.1)
BASOPHILS NFR BLD AUTO: 1 % (ref 0–1)
BILIRUB SERPL-MCNC: 0.43 MG/DL (ref 0.2–1)
BILIRUB SERPL-MCNC: 0.5 MG/DL (ref 0.2–1)
BLD GP AB SCN SERPL QL: NEGATIVE
BUN SERPL-MCNC: 7 MG/DL (ref 5–25)
BUN SERPL-MCNC: 7 MG/DL (ref 5–25)
CALCIUM SERPL-MCNC: 8.8 MG/DL (ref 8.4–10.2)
CALCIUM SERPL-MCNC: 9.3 MG/DL (ref 8.4–10.2)
CHLORIDE SERPL-SCNC: 98 MMOL/L (ref 96–108)
CHLORIDE SERPL-SCNC: 99 MMOL/L (ref 96–108)
CO2 SERPL-SCNC: 24 MMOL/L (ref 21–32)
CO2 SERPL-SCNC: 25 MMOL/L (ref 21–32)
CREAT SERPL-MCNC: 0.6 MG/DL (ref 0.6–1.3)
CREAT SERPL-MCNC: 0.65 MG/DL (ref 0.6–1.3)
CREAT UR-MCNC: 198 MG/DL
EOSINOPHIL # BLD AUTO: 0.12 THOUSAND/ΜL (ref 0–0.61)
EOSINOPHIL NFR BLD AUTO: 2 % (ref 0–6)
ERYTHROCYTE [DISTWIDTH] IN BLOOD BY AUTOMATED COUNT: 13 % (ref 11.6–15.1)
ERYTHROCYTE [DISTWIDTH] IN BLOOD BY AUTOMATED COUNT: 13.2 % (ref 11.6–15.1)
GFR SERPL CREATININE-BSD FRML MDRD: 128 ML/MIN/1.73SQ M
GFR SERPL CREATININE-BSD FRML MDRD: 131 ML/MIN/1.73SQ M
GLUCOSE SERPL-MCNC: 87 MG/DL (ref 65–140)
GLUCOSE SERPL-MCNC: 91 MG/DL (ref 65–140)
HCT VFR BLD AUTO: 34 % (ref 34.8–46.1)
HCT VFR BLD AUTO: 34.1 % (ref 34.8–46.1)
HGB BLD-MCNC: 11.5 G/DL (ref 11.5–15.4)
HGB BLD-MCNC: 11.6 G/DL (ref 11.5–15.4)
IMM GRANULOCYTES # BLD AUTO: 0.04 THOUSAND/UL (ref 0–0.2)
IMM GRANULOCYTES NFR BLD AUTO: 1 % (ref 0–2)
LYMPHOCYTES # BLD AUTO: 1.65 THOUSANDS/ΜL (ref 0.6–4.47)
LYMPHOCYTES NFR BLD AUTO: 28 % (ref 14–44)
MAGNESIUM SERPL-MCNC: 4.8 MG/DL (ref 1.9–2.7)
MCH RBC QN AUTO: 28.3 PG (ref 26.8–34.3)
MCH RBC QN AUTO: 28.3 PG (ref 26.8–34.3)
MCHC RBC AUTO-ENTMCNC: 33.8 G/DL (ref 31.4–37.4)
MCHC RBC AUTO-ENTMCNC: 34 G/DL (ref 31.4–37.4)
MCV RBC AUTO: 83 FL (ref 82–98)
MCV RBC AUTO: 84 FL (ref 82–98)
MONOCYTES # BLD AUTO: 0.49 THOUSAND/ΜL (ref 0.17–1.22)
MONOCYTES NFR BLD AUTO: 8 % (ref 4–12)
NEUTROPHILS # BLD AUTO: 3.51 THOUSANDS/ΜL (ref 1.85–7.62)
NEUTS SEG NFR BLD AUTO: 60 % (ref 43–75)
NRBC BLD AUTO-RTO: 0 /100 WBCS
PLATELET # BLD AUTO: 322 THOUSANDS/UL (ref 149–390)
PLATELET # BLD AUTO: 332 THOUSANDS/UL (ref 149–390)
PMV BLD AUTO: 10.9 FL (ref 8.9–12.7)
PMV BLD AUTO: 11 FL (ref 8.9–12.7)
POTASSIUM SERPL-SCNC: 4.1 MMOL/L (ref 3.5–5.3)
POTASSIUM SERPL-SCNC: 4.2 MMOL/L (ref 3.5–5.3)
PROT SERPL-MCNC: 6.8 G/DL (ref 6.4–8.4)
PROT SERPL-MCNC: 6.8 G/DL (ref 6.4–8.4)
PROT UR-MCNC: 48.1 MG/DL
PROT/CREAT UR: 0.2 MG/G{CREAT} (ref 0–0.1)
RBC # BLD AUTO: 4.07 MILLION/UL (ref 3.81–5.12)
RBC # BLD AUTO: 4.1 MILLION/UL (ref 3.81–5.12)
RH BLD: POSITIVE
SODIUM SERPL-SCNC: 130 MMOL/L (ref 135–147)
SODIUM SERPL-SCNC: 132 MMOL/L (ref 135–147)
SPECIMEN EXPIRATION DATE: NORMAL
TREPONEMA PALLIDUM IGG+IGM AB [PRESENCE] IN SERUM OR PLASMA BY IMMUNOASSAY: NORMAL
WBC # BLD AUTO: 5.86 THOUSAND/UL (ref 4.31–10.16)
WBC # BLD AUTO: 6.03 THOUSAND/UL (ref 4.31–10.16)

## 2024-10-14 PROCEDURE — 83735 ASSAY OF MAGNESIUM: CPT

## 2024-10-14 PROCEDURE — 80053 COMPREHEN METABOLIC PANEL: CPT

## 2024-10-14 PROCEDURE — 99203 OFFICE O/P NEW LOW 30 MIN: CPT

## 2024-10-14 PROCEDURE — 10H07YZ INSERTION OF OTHER DEVICE INTO PRODUCTS OF CONCEPTION, VIA NATURAL OR ARTIFICIAL OPENING: ICD-10-PCS | Performed by: OBSTETRICS & GYNECOLOGY

## 2024-10-14 PROCEDURE — NC001 PR NO CHARGE: Performed by: OBSTETRICS & GYNECOLOGY

## 2024-10-14 PROCEDURE — 84156 ASSAY OF PROTEIN URINE: CPT

## 2024-10-14 PROCEDURE — 99215 OFFICE O/P EST HI 40 MIN: CPT | Performed by: NURSE PRACTITIONER

## 2024-10-14 PROCEDURE — 4A1H7CZ MONITORING OF PRODUCTS OF CONCEPTION, CARDIAC RATE, VIA NATURAL OR ARTIFICIAL OPENING: ICD-10-PCS | Performed by: OBSTETRICS & GYNECOLOGY

## 2024-10-14 PROCEDURE — 86850 RBC ANTIBODY SCREEN: CPT

## 2024-10-14 PROCEDURE — 86780 TREPONEMA PALLIDUM: CPT

## 2024-10-14 PROCEDURE — 85025 COMPLETE CBC W/AUTO DIFF WBC: CPT

## 2024-10-14 PROCEDURE — 85027 COMPLETE CBC AUTOMATED: CPT

## 2024-10-14 PROCEDURE — 86901 BLOOD TYPING SEROLOGIC RH(D): CPT

## 2024-10-14 PROCEDURE — 82570 ASSAY OF URINE CREATININE: CPT

## 2024-10-14 PROCEDURE — 4A1HXCZ MONITORING OF PRODUCTS OF CONCEPTION, CARDIAC RATE, EXTERNAL APPROACH: ICD-10-PCS | Performed by: OBSTETRICS & GYNECOLOGY

## 2024-10-14 PROCEDURE — 86900 BLOOD TYPING SEROLOGIC ABO: CPT

## 2024-10-14 RX ORDER — ONDANSETRON 2 MG/ML
4 INJECTION INTRAMUSCULAR; INTRAVENOUS EVERY 6 HOURS PRN
Status: DISCONTINUED | OUTPATIENT
Start: 2024-10-14 | End: 2024-10-15

## 2024-10-14 RX ORDER — SODIUM CHLORIDE, SODIUM LACTATE, POTASSIUM CHLORIDE, CALCIUM CHLORIDE 600; 310; 30; 20 MG/100ML; MG/100ML; MG/100ML; MG/100ML
125 INJECTION, SOLUTION INTRAVENOUS CONTINUOUS
Status: DISCONTINUED | OUTPATIENT
Start: 2024-10-14 | End: 2024-10-14

## 2024-10-14 RX ORDER — ONDANSETRON 2 MG/ML
4 INJECTION INTRAMUSCULAR; INTRAVENOUS EVERY 6 HOURS PRN
Status: DISCONTINUED | OUTPATIENT
Start: 2024-10-14 | End: 2024-10-14

## 2024-10-14 RX ORDER — SODIUM CHLORIDE, SODIUM LACTATE, POTASSIUM CHLORIDE, CALCIUM CHLORIDE 600; 310; 30; 20 MG/100ML; MG/100ML; MG/100ML; MG/100ML
50 INJECTION, SOLUTION INTRAVENOUS CONTINUOUS
Status: DISCONTINUED | OUTPATIENT
Start: 2024-10-14 | End: 2024-10-18 | Stop reason: HOSPADM

## 2024-10-14 RX ORDER — CALCIUM GLUCONATE 94 MG/ML
1 INJECTION, SOLUTION INTRAVENOUS ONCE AS NEEDED
Status: DISCONTINUED | OUTPATIENT
Start: 2024-10-14 | End: 2024-10-15

## 2024-10-14 RX ORDER — BUPIVACAINE HYDROCHLORIDE 2.5 MG/ML
30 INJECTION, SOLUTION EPIDURAL; INFILTRATION; INTRACAUDAL ONCE AS NEEDED
Status: DISCONTINUED | OUTPATIENT
Start: 2024-10-14 | End: 2024-10-14

## 2024-10-14 RX ORDER — BUPIVACAINE HYDROCHLORIDE 2.5 MG/ML
30 INJECTION, SOLUTION EPIDURAL; INFILTRATION; INTRACAUDAL ONCE AS NEEDED
Status: DISCONTINUED | OUTPATIENT
Start: 2024-10-14 | End: 2024-10-15

## 2024-10-14 RX ORDER — MAGNESIUM SULFATE HEPTAHYDRATE 40 MG/ML
2 INJECTION, SOLUTION INTRAVENOUS CONTINUOUS
Status: DISPENSED | OUTPATIENT
Start: 2024-10-14 | End: 2024-10-16

## 2024-10-14 RX ORDER — MAGNESIUM SULFATE HEPTAHYDRATE 40 MG/ML
4 INJECTION, SOLUTION INTRAVENOUS ONCE
Status: COMPLETED | OUTPATIENT
Start: 2024-10-14 | End: 2024-10-14

## 2024-10-14 RX ORDER — MAGNESIUM SULFATE HEPTAHYDRATE 40 MG/ML
2 INJECTION, SOLUTION INTRAVENOUS ONCE
Status: COMPLETED | OUTPATIENT
Start: 2024-10-14 | End: 2024-10-14

## 2024-10-14 RX ADMIN — MAGNESIUM SULFATE HEPTAHYDRATE 2 G: 40 INJECTION, SOLUTION INTRAVENOUS at 15:39

## 2024-10-14 RX ADMIN — Medication 50 MCG: at 17:15

## 2024-10-14 RX ADMIN — MAGNESIUM SULFATE HEPTAHYDRATE 2 G/HR: 40 INJECTION, SOLUTION INTRAVENOUS at 15:51

## 2024-10-14 RX ADMIN — Medication 50 MCG: at 20:17

## 2024-10-14 RX ADMIN — SODIUM CHLORIDE, SODIUM LACTATE, POTASSIUM CHLORIDE, AND CALCIUM CHLORIDE 50 ML/HR: .6; .31; .03; .02 INJECTION, SOLUTION INTRAVENOUS at 15:11

## 2024-10-14 RX ADMIN — MAGNESIUM SULFATE HEPTAHYDRATE 4 G: 40 INJECTION, SOLUTION INTRAVENOUS at 15:15

## 2024-10-14 NOTE — ASSESSMENT & PLAN NOTE
Elevated blood pressures 4 hours apart in the absence of proteinuria, with elevated AST//116.  CBC wnl, CMP notable for AST//116; P/C 0.2  AST//116 >> 168/167 >> 87/121 > 64/98  S/p 24h postpartum magnesium   Monitor BP  Enrolled in OB Postpartum Blood Pressure Monitoring Program  10/17: Procardia XL 30 mg qD    Systolic (24hrs), Av , Min:126 , Max:144   Diastolic (24hrs), Av, Min:80, Max:99

## 2024-10-14 NOTE — PROGRESS NOTES
Assessment & Plan  19 y.o.  at 39w2d presenting for routine prenatal visit.   Initial BP was 136/91, denies H/A visual changes or upper abdominal pain, repeat was 138/98, D/W Dr. Garcia, pt was sent to triage for BP monitoring and labs  Pt declines induction of labor  Reinforced kick counts    Problem List Items Addressed This Visit          Obstetrics/Gynecology    39 weeks gestation of pregnancy     Other Visit Diagnoses       Prenatal care in third trimester    -  Primary          ____________________________________________________________  Subjective  She is without complaint.   She denies contractions, loss of fluid, or vaginal bleeding.   She feels regular fetal movements.     WNL respiratory effort, negative cough or SOB    Objective  /91 (BP Location: Left arm, Patient Position: Sitting, Cuff Size: Standard)   Pulse 71   Wt 82 kg (180 lb 12.8 oz)   LMP 2024 (Exact Date)   BMI 34.16 kg/m²          Patient's Active Problem List  Patient Active Problem List   Diagnosis    History of migraine headaches    Elevated blood pressure reading without diagnosis of hypertension    39 weeks gestation of pregnancy     Plan  Present to triage for BP monitoring and labs  To call with vaginal bleeding, loss of fluid, regular contractions, decreased fetal movement, other needs or concerns.  Return in 1 week or as advised in triage  Pt verbalized understanding of all discussed.

## 2024-10-14 NOTE — PATIENT INSTRUCTIONS
LABOR PRECAUTIONS  Call our office at [phone] for any of the following:    * I need to call immediately I if I have even a small amount of LIQUID  leaking from my vagina, with or without contractions.   * I need to call if I am BLEEDING an amount equal to or more than a period.  A small amount of bloody vaginal discharge is normal at the end of the pregnancy.   * I need to call if I am having CONTRACTIONS  every five minutes for at least an hour.  I will need a watch in order to time them.  I should time them from the beginning of one contraction until the beginning of the next one.   * I need to call BEFORE  I go to the hospital.   * I need to have a plan for TRANSPORTATION  to get to the hospital when I am in labor.  Labor is generally not an emergency which requires an ambulance.          FETAL KICK COUNTS    In the third trimester (after 28 weeks gestation) you should be performing fetal kick counts every day.  Your baby should move at least 10 times in 2 hours during an active time, once a day.    Choose atime of day when your baby is most active.  Try to do this around the same time each day.  Get into a comfortable position and then write down the time your baby first moves.  Count each movement until the baby moves 10 times.  These movements include kicks, punches, nudges, flutters, or rolls.  This can take anywhere from 5 minutes to 2 hours.  Write down the time you feel the baby's 10th movement.    If 2 hours has passed and your baby has not moved at least 10 times, you should CALL THE OFFICE RIGHT AWAY.  [phone]        PERINEAL / VAGINAL MASSAGE    What can I do now to decrease my chances of tearing during delivery?  Massaging around the vaginal opening by you (or your partner), either antepartum (before birth) or during the second stage of labor, can reduce the likelihood of perineal tearing during childbirth.  Likewise, the use of warm packs held on the perineum during the pushing stage of  labor can reduce the severity of your tear.  This will happen during the pushing stage of labor.  At home, you can also help reduce the chances of injury that may occur during the birth of your child through perineal massage.    When should I do this?  Starting around or shortly after 34 weeks of pregnancy, you or your partner should provide 5-10 minutes of vaginal massage 1-4 times per week.    How?  Use either almond, coconut, or olive oil and water mixture on 1 or 2 fingers (depending on comfort).  Insert finger(s) 3-5cm into the vagina.  Apply sweeping downward/sideward pressure from 3 to 9 o'clock for 5-10 minutes, 1-4 times per week.        GROUP B STREP    Group B Strep (GBS) is a common vaginal bacteria.  Approximately 25% of women normally have GBS bacteria present in the vagina.  It is NOT a sexually-transmitted infection.  In fact, it is not an infection AT ALL!  It is just a normal vaginal bacteria for many women.    However, the GBS bacteria can be dangerous to a  baby if the baby is exposed to that particular bacteria during labor and birth AND develops an infection from it.  The likelihood of a  GBS infection for a woman who has GBS bacteria in the vagina is about 1%-2%.  But if it does occur, a baby could become severely ill.    For this reason, we do a vaginal culture (Q-tip swab of the vagina and rectum) for ALL pregnant women at approximately 36 weeks of pregnancy.  If the culture shows that there is GBS bacteria present, it is NOT a reason to panic!  Because in this situation we will give this woman antibiotics through her IV while she is in labor.  When a mother is treated with antibiotics during labor and delivery, her baby ALMOST NEVER becomes infected with GBS bacteria.     Present to triage for BP monitoring and labs  Return in 1 week or as advised in labor and delivery

## 2024-10-14 NOTE — ASSESSMENT & PLAN NOTE
Admit to OBGYN   Clear liquid diet, IVF LR 125cc/hr   F/u T&S, CBC, RPR   GBS negative; EFW: 7lbs by Janet's   SVE: 0/20/-4  Continuous fetal monitoring and tocometry   Analgesia at maternal request   Vertex by TAUS  Contraception plan: POP's  Plan: plan to start induction with oral cytotec, followed by valdivia balloon

## 2024-10-14 NOTE — H&P
H & P- Obstetrics   Brian Guthrie 19 y.o. female MRN: [id number]  Unit/Bed#: -01 Encounter: [id number]    Assessment: 19 y.o.  at 39w2d admitted for preeclampsia with severe features.    Plan:   Encounter for induction of labor  Assessment & Plan  Induction of labor for PreE w/SF (AST/ALT)  Induction consents signed    39 weeks gestation of pregnancy  Assessment & Plan  Admit to OBGYN   Clear liquid diet, IVF LR 125cc/hr   F/u T&S, CBC, RPR   GBS negative; EFW: 7lbs by Janet's   SVE: 020/-4  Continuous fetal monitoring and tocometry   Analgesia at maternal request   Vertex by TAUS  Contraception plan: POP's  Plan: plan to start induction with oral cytotec, followed by valdivia balloon     * Pre-eclampsia with severe features  Assessment & Plan  Elevated blood pressures 4 hours apart in the absence of proteinuria, with elevated AST//116.  CBC wnl, CMP notable for AST//116; P/C 0.2  Mag started on 10/14/24 @1515  Monitor BP's    Systolic (12hrs), Av , Min:124 , Max:153   Diastolic (12hrs), Av, Min:77, Max:99              Discussed case and plan w/ Dr. Romero      Chief Complaint: induction of labor for preeclampsia with severe features    HPI: Brian Guthrie is a 19 y.o.  with an JABIER of 10/19/2024, by Last Menstrual Period at 39w2d who is being admitted for preeclampsia  severe features. She initially presented to triage after being sent for elevated blood pressures in the office.  In triage her CMP was significant for elevated AST/ALT of 102/116. In triage, she had elevated blood pressures across 4 hours, and with the elevated AST/ALT in the absence of proteinuria, she met criteria for preeclampsia with severe features. She was recommended to stay in the hospital for induction of labor as well as start magnesium for seizure prophylaxis. She also endorses itchiness over her arms bilaterally to her chest, well as itchiness over her legs bilaterally. However, she denies  itchiness over her palms and soles. She denies chest pain, shortness of breath, headaches, vision changes She noticed itchiness for over the last week. She denies having uterine contractions, has no LOF, and reports no VB. She states she has felt good FM.    Patient Active Problem List   Diagnosis    History of migraine headaches    Elevated blood pressure reading without diagnosis of hypertension    39 weeks gestation of pregnancy    Pre-eclampsia with severe features    Encounter for induction of labor       Baby complications/comments: none    Review of Systems   Constitutional:  Negative for chills and fever.   HENT:  Negative for ear pain and sore throat.    Eyes:  Negative for pain and visual disturbance.   Respiratory:  Negative for cough, chest tightness and shortness of breath.    Cardiovascular:  Negative for chest pain and palpitations.   Gastrointestinal:  Negative for abdominal pain and vomiting.   Genitourinary:  Negative for dysuria and hematuria.   Musculoskeletal:  Negative for arthralgias and back pain.   Skin:  Negative for color change and rash.   Neurological:  Negative for seizures, syncope and headaches.   Psychiatric/Behavioral:  Negative for agitation and behavioral problems. The patient is not nervous/anxious.    All other systems reviewed and are negative.      OB Hx:  OB History    Para Term  AB Living   1 0   0 0 0   SAB IAB Ectopic Multiple Live Births   0 0 0 0 0      # Outcome Date GA Lbr Colton/2nd Weight Sex Type Anes PTL Lv   1 Current                Past Medical Hx:  Past Medical History:   Diagnosis Date    Asthma     Juvenile arthritis (HCC) 10/10/2014       Past Surgical hx:  No past surgical history on file.    Social Hx:  Alcohol use: denies  Tobacco use: denies  Other substance use: denies    Allergies   Allergen Reactions    Amoxicillin     Penicillins Hives and Other (See Comments)     Not known at this time. Presumably amoxicillin rash in infancy            Medications Prior to Admission:     aspirin 81 mg chewable tablet    cyproheptadine (PERIACTIN) 4 mg tablet    Prenatal Vit-Fe Fumarate-FA (Prenatal Vitamin) 27-0.8 MG TABS    Objective:  Temp:  [97.5 °F (36.4 °C)] 97.5 °F (36.4 °C)  HR:  [] 88  BP: (124-153)/(77-99) 127/82  Resp:  [20] 20  SpO2:  [98 %] 98 %  O2 Device: None (Room air)  Body mass index is 34.16 kg/m².     Physical Exam:  Physical Exam  Constitutional:       Appearance: Normal appearance.   HENT:      Head: Normocephalic and atraumatic.      Mouth/Throat:      Pharynx: Oropharynx is clear.   Eyes:      General: No scleral icterus.     Extraocular Movements: Extraocular movements intact.   Cardiovascular:      Rate and Rhythm: Normal rate and regular rhythm.      Pulses: Normal pulses.   Pulmonary:      Effort: Pulmonary effort is normal. No respiratory distress.   Abdominal:      Tenderness: There is no abdominal tenderness.   Musculoskeletal:      Right lower leg: No edema.      Left lower leg: No edema.   Neurological:      General: No focal deficit present.      Mental Status: She is alert and oriented to person, place, and time.   Skin:     General: Skin is warm and dry.   Psychiatric:         Mood and Affect: Mood normal.         Behavior: Behavior normal.         Thought Content: Thought content normal.         Judgment: Judgment normal.   Vitals and nursing note reviewed. Exam conducted with a chaperone present.            FHT:  Baseline Rate (FHR): 135 bpm  Variability: Moderate  Accelerations: 15 x 15 or greater  Decelerations: None    TOCO:   Contraction Frequency (minutes): 0  Contraction Duration (seconds): 0    Lab Results   Component Value Date    WBC 5.86 10/14/2024    HGB 11.6 10/14/2024    HCT 34.1 (L) 10/14/2024     10/14/2024     Lab Results   Component Value Date    K 4.1 10/14/2024    CL 99 10/14/2024    CO2 25 10/14/2024    BUN 7 10/14/2024    CREATININE 0.60 10/14/2024     (H) 10/14/2024      (H) 10/14/2024       Prenatal Labs: Reviewed      Blood type: O positive  Antibody: negative  GBS: negative  HIV: Non-reactive  Rubella: Immune  Syphilis IgM/IgG: Non-reactive  HBsAg: Non-reactive  HCAb: Non-reactive  Chlamydia: Negative  Gonorrhea: Negative  Diabetes 1 hour screen: 74  3 hour glucose: N/A  Platelets: 293  Hgb: 11.1    >2 Midnights  INPATIENT     Signature/Title: Azeem Moreira MD  Date: 10/14/2024  Time: 5:23 PM

## 2024-10-15 ENCOUNTER — ANESTHESIA EVENT (INPATIENT)
Dept: ANESTHESIOLOGY | Facility: HOSPITAL | Age: 19
DRG: 560 | End: 2024-10-15
Payer: COMMERCIAL

## 2024-10-15 ENCOUNTER — ANESTHESIA (INPATIENT)
Dept: ANESTHESIOLOGY | Facility: HOSPITAL | Age: 19
DRG: 560 | End: 2024-10-15
Payer: COMMERCIAL

## 2024-10-15 PROBLEM — J45.909 ASTHMA: Status: ACTIVE | Noted: 2024-10-15

## 2024-10-15 LAB
ALBUMIN SERPL BCG-MCNC: 3.3 G/DL (ref 3.5–5)
ALBUMIN SERPL BCG-MCNC: 3.4 G/DL (ref 3.5–5)
ALBUMIN SERPL BCG-MCNC: 3.5 G/DL (ref 3.5–5)
ALBUMIN SERPL BCG-MCNC: 3.5 G/DL (ref 3.5–5)
ALP SERPL-CCNC: 220 U/L (ref 34–104)
ALP SERPL-CCNC: 242 U/L (ref 34–104)
ALP SERPL-CCNC: 245 U/L (ref 34–104)
ALP SERPL-CCNC: 255 U/L (ref 34–104)
ALT SERPL W P-5'-P-CCNC: 106 U/L (ref 7–52)
ALT SERPL W P-5'-P-CCNC: 126 U/L (ref 7–52)
ALT SERPL W P-5'-P-CCNC: 156 U/L (ref 7–52)
ALT SERPL W P-5'-P-CCNC: 167 U/L (ref 7–52)
ANION GAP SERPL CALCULATED.3IONS-SCNC: 5 MMOL/L (ref 4–13)
ANION GAP SERPL CALCULATED.3IONS-SCNC: 7 MMOL/L (ref 4–13)
ANION GAP SERPL CALCULATED.3IONS-SCNC: 8 MMOL/L (ref 4–13)
ANION GAP SERPL CALCULATED.3IONS-SCNC: 8 MMOL/L (ref 4–13)
AST SERPL W P-5'-P-CCNC: 121 U/L (ref 13–39)
AST SERPL W P-5'-P-CCNC: 150 U/L (ref 13–39)
AST SERPL W P-5'-P-CCNC: 168 U/L (ref 13–39)
AST SERPL W P-5'-P-CCNC: 85 U/L (ref 13–39)
BASE EXCESS BLDCOA CALC-SCNC: -9.4 MMOL/L (ref 3–11)
BASE EXCESS BLDCOV CALC-SCNC: -8.4 MMOL/L (ref 1–9)
BILIRUB SERPL-MCNC: 0.49 MG/DL (ref 0.2–1)
BILIRUB SERPL-MCNC: 0.53 MG/DL (ref 0.2–1)
BILIRUB SERPL-MCNC: 0.62 MG/DL (ref 0.2–1)
BILIRUB SERPL-MCNC: 0.71 MG/DL (ref 0.2–1)
BUN SERPL-MCNC: 7 MG/DL (ref 5–25)
BUN SERPL-MCNC: 7 MG/DL (ref 5–25)
BUN SERPL-MCNC: 8 MG/DL (ref 5–25)
BUN SERPL-MCNC: 9 MG/DL (ref 5–25)
CALCIUM ALBUM COR SERPL-MCNC: 7.9 MG/DL (ref 8.3–10.1)
CALCIUM ALBUM COR SERPL-MCNC: 8.6 MG/DL (ref 8.3–10.1)
CALCIUM SERPL-MCNC: 7.3 MG/DL (ref 8.4–10.2)
CALCIUM SERPL-MCNC: 7.8 MG/DL (ref 8.4–10.2)
CALCIUM SERPL-MCNC: 8.1 MG/DL (ref 8.4–10.2)
CALCIUM SERPL-MCNC: 8.1 MG/DL (ref 8.4–10.2)
CHLORIDE SERPL-SCNC: 97 MMOL/L (ref 96–108)
CHLORIDE SERPL-SCNC: 98 MMOL/L (ref 96–108)
CO2 SERPL-SCNC: 21 MMOL/L (ref 21–32)
CO2 SERPL-SCNC: 23 MMOL/L (ref 21–32)
CO2 SERPL-SCNC: 24 MMOL/L (ref 21–32)
CO2 SERPL-SCNC: 27 MMOL/L (ref 21–32)
CREAT SERPL-MCNC: 0.62 MG/DL (ref 0.6–1.3)
CREAT SERPL-MCNC: 0.74 MG/DL (ref 0.6–1.3)
CREAT SERPL-MCNC: 0.8 MG/DL (ref 0.6–1.3)
CREAT SERPL-MCNC: 0.92 MG/DL (ref 0.6–1.3)
ERYTHROCYTE [DISTWIDTH] IN BLOOD BY AUTOMATED COUNT: 13.1 % (ref 11.6–15.1)
ERYTHROCYTE [DISTWIDTH] IN BLOOD BY AUTOMATED COUNT: 13.2 % (ref 11.6–15.1)
ERYTHROCYTE [DISTWIDTH] IN BLOOD BY AUTOMATED COUNT: 13.2 % (ref 11.6–15.1)
ERYTHROCYTE [DISTWIDTH] IN BLOOD BY AUTOMATED COUNT: 13.3 % (ref 11.6–15.1)
GFR SERPL CREATININE-BSD FRML MDRD: 106 ML/MIN/1.73SQ M
GFR SERPL CREATININE-BSD FRML MDRD: 116 ML/MIN/1.73SQ M
GFR SERPL CREATININE-BSD FRML MDRD: 130 ML/MIN/1.73SQ M
GFR SERPL CREATININE-BSD FRML MDRD: 89 ML/MIN/1.73SQ M
GLUCOSE SERPL-MCNC: 166 MG/DL (ref 65–140)
GLUCOSE SERPL-MCNC: 79 MG/DL (ref 65–140)
GLUCOSE SERPL-MCNC: 80 MG/DL (ref 65–140)
GLUCOSE SERPL-MCNC: 92 MG/DL (ref 65–140)
HCO3 BLDCOA-SCNC: 19.8 MMOL/L (ref 17.3–27.3)
HCO3 BLDCOV-SCNC: 18.6 MMOL/L (ref 12.2–28.6)
HCT VFR BLD AUTO: 31.7 % (ref 34.8–46.1)
HCT VFR BLD AUTO: 33.7 % (ref 34.8–46.1)
HCT VFR BLD AUTO: 34.9 % (ref 34.8–46.1)
HCT VFR BLD AUTO: 35.1 % (ref 34.8–46.1)
HGB BLD-MCNC: 10.7 G/DL (ref 11.5–15.4)
HGB BLD-MCNC: 11.2 G/DL (ref 11.5–15.4)
HGB BLD-MCNC: 11.6 G/DL (ref 11.5–15.4)
HGB BLD-MCNC: 11.7 G/DL (ref 11.5–15.4)
MAGNESIUM SERPL-MCNC: 5.3 MG/DL (ref 1.9–2.7)
MAGNESIUM SERPL-MCNC: 5.6 MG/DL (ref 1.9–2.7)
MAGNESIUM SERPL-MCNC: 6.1 MG/DL (ref 1.9–2.7)
MAGNESIUM SERPL-MCNC: 6.7 MG/DL (ref 1.9–2.7)
MCH RBC QN AUTO: 27.9 PG (ref 26.8–34.3)
MCH RBC QN AUTO: 28.1 PG (ref 26.8–34.3)
MCH RBC QN AUTO: 28.7 PG (ref 26.8–34.3)
MCH RBC QN AUTO: 28.9 PG (ref 26.8–34.3)
MCHC RBC AUTO-ENTMCNC: 33.2 G/DL (ref 31.4–37.4)
MCHC RBC AUTO-ENTMCNC: 33.2 G/DL (ref 31.4–37.4)
MCHC RBC AUTO-ENTMCNC: 33.3 G/DL (ref 31.4–37.4)
MCHC RBC AUTO-ENTMCNC: 33.8 G/DL (ref 31.4–37.4)
MCV RBC AUTO: 84 FL (ref 82–98)
MCV RBC AUTO: 84 FL (ref 82–98)
MCV RBC AUTO: 86 FL (ref 82–98)
MCV RBC AUTO: 86 FL (ref 82–98)
O2 CT VFR BLDCOA CALC: 4.9 ML/DL
OXYHGB MFR BLDCOA: 20.9 %
OXYHGB MFR BLDCOV: 41 %
PCO2 BLDCOA: 55.4 MM[HG] (ref 30–60)
PCO2 BLDCOV: 43.7 MM HG (ref 27–43)
PH BLDCOA: 7.17 [PH] (ref 7.23–7.43)
PH BLDCOV: 7.25 [PH] (ref 7.19–7.49)
PLATELET # BLD AUTO: 293 THOUSANDS/UL (ref 149–390)
PLATELET # BLD AUTO: 300 THOUSANDS/UL (ref 149–390)
PLATELET # BLD AUTO: 315 THOUSANDS/UL (ref 149–390)
PLATELET # BLD AUTO: 320 THOUSANDS/UL (ref 149–390)
PMV BLD AUTO: 10.4 FL (ref 8.9–12.7)
PMV BLD AUTO: 10.5 FL (ref 8.9–12.7)
PMV BLD AUTO: 10.9 FL (ref 8.9–12.7)
PMV BLD AUTO: 11.1 FL (ref 8.9–12.7)
PO2 BLDCOA: 14.4 MM HG (ref 5–25)
PO2 BLDCOV: 21.7 MM HG (ref 15–45)
POTASSIUM SERPL-SCNC: 3.9 MMOL/L (ref 3.5–5.3)
POTASSIUM SERPL-SCNC: 4.5 MMOL/L (ref 3.5–5.3)
POTASSIUM SERPL-SCNC: 4.6 MMOL/L (ref 3.5–5.3)
POTASSIUM SERPL-SCNC: 5 MMOL/L (ref 3.5–5.3)
PROT SERPL-MCNC: 6.2 G/DL (ref 6.4–8.4)
PROT SERPL-MCNC: 6.7 G/DL (ref 6.4–8.4)
PROT SERPL-MCNC: 6.8 G/DL (ref 6.4–8.4)
PROT SERPL-MCNC: 6.9 G/DL (ref 6.4–8.4)
RBC # BLD AUTO: 3.7 MILLION/UL (ref 3.81–5.12)
RBC # BLD AUTO: 3.9 MILLION/UL (ref 3.81–5.12)
RBC # BLD AUTO: 4.16 MILLION/UL (ref 3.81–5.12)
RBC # BLD AUTO: 4.16 MILLION/UL (ref 3.81–5.12)
SAO2 % BLDCOV: 9.9 ML/DL
SODIUM SERPL-SCNC: 126 MMOL/L (ref 135–147)
SODIUM SERPL-SCNC: 128 MMOL/L (ref 135–147)
SODIUM SERPL-SCNC: 129 MMOL/L (ref 135–147)
SODIUM SERPL-SCNC: 129 MMOL/L (ref 135–147)
WBC # BLD AUTO: 10.9 THOUSAND/UL (ref 4.31–10.16)
WBC # BLD AUTO: 22.05 THOUSAND/UL (ref 4.31–10.16)
WBC # BLD AUTO: 7.1 THOUSAND/UL (ref 4.31–10.16)
WBC # BLD AUTO: 9.05 THOUSAND/UL (ref 4.31–10.16)

## 2024-10-15 PROCEDURE — 83735 ASSAY OF MAGNESIUM: CPT

## 2024-10-15 PROCEDURE — 85027 COMPLETE CBC AUTOMATED: CPT

## 2024-10-15 PROCEDURE — 82805 BLOOD GASES W/O2 SATURATION: CPT

## 2024-10-15 PROCEDURE — 80053 COMPREHEN METABOLIC PANEL: CPT

## 2024-10-15 PROCEDURE — 88307 TISSUE EXAM BY PATHOLOGIST: CPT | Performed by: PATHOLOGY

## 2024-10-15 PROCEDURE — 3E0R3GC INTRODUCTION OF OTHER THERAPEUTIC SUBSTANCE INTO SPINAL CANAL, PERCUTANEOUS APPROACH: ICD-10-PCS | Performed by: ANESTHESIOLOGY

## 2024-10-15 PROCEDURE — 59409 OBSTETRICAL CARE: CPT | Performed by: OBSTETRICS & GYNECOLOGY

## 2024-10-15 RX ORDER — CARBOPROST TROMETHAMINE 250 UG/ML
INJECTION, SOLUTION INTRAMUSCULAR
Status: DISPENSED
Start: 2024-10-15 | End: 2024-10-16

## 2024-10-15 RX ORDER — BENZOCAINE/MENTHOL 6 MG-10 MG
1 LOZENGE MUCOUS MEMBRANE DAILY PRN
Status: DISCONTINUED | OUTPATIENT
Start: 2024-10-15 | End: 2024-10-18 | Stop reason: HOSPADM

## 2024-10-15 RX ORDER — DIPHENHYDRAMINE HYDROCHLORIDE 50 MG/ML
12.5 INJECTION INTRAMUSCULAR; INTRAVENOUS EVERY 6 HOURS PRN
Status: DISCONTINUED | OUTPATIENT
Start: 2024-10-15 | End: 2024-10-15

## 2024-10-15 RX ORDER — OXYTOCIN/RINGER'S LACTATE 30/500 ML
250 PLASTIC BAG, INJECTION (ML) INTRAVENOUS ONCE
Status: DISCONTINUED | OUTPATIENT
Start: 2024-10-15 | End: 2024-10-17

## 2024-10-15 RX ORDER — ROPIVACAINE HYDROCHLORIDE 2 MG/ML
INJECTION, SOLUTION EPIDURAL; INFILTRATION; PERINEURAL
Status: COMPLETED | OUTPATIENT
Start: 2024-10-15 | End: 2024-10-15

## 2024-10-15 RX ORDER — ACETAMINOPHEN 325 MG/1
650 TABLET ORAL EVERY 4 HOURS PRN
Status: DISCONTINUED | OUTPATIENT
Start: 2024-10-15 | End: 2024-10-18 | Stop reason: HOSPADM

## 2024-10-15 RX ORDER — OXYTOCIN/RINGER'S LACTATE 30/500 ML
1-30 PLASTIC BAG, INJECTION (ML) INTRAVENOUS
Status: DISCONTINUED | OUTPATIENT
Start: 2024-10-15 | End: 2024-10-17

## 2024-10-15 RX ORDER — ONDANSETRON 2 MG/ML
4 INJECTION INTRAMUSCULAR; INTRAVENOUS EVERY 8 HOURS PRN
Status: DISCONTINUED | OUTPATIENT
Start: 2024-10-15 | End: 2024-10-18 | Stop reason: HOSPADM

## 2024-10-15 RX ORDER — TRANEXAMIC ACID 10 MG/ML
1000 INJECTION, SOLUTION INTRAVENOUS ONCE
Status: COMPLETED | OUTPATIENT
Start: 2024-10-15 | End: 2024-10-15

## 2024-10-15 RX ORDER — SIMETHICONE 80 MG
80 TABLET,CHEWABLE ORAL 4 TIMES DAILY PRN
Status: DISCONTINUED | OUTPATIENT
Start: 2024-10-15 | End: 2024-10-18 | Stop reason: HOSPADM

## 2024-10-15 RX ORDER — IBUPROFEN 600 MG/1
600 TABLET, FILM COATED ORAL EVERY 6 HOURS
Status: DISCONTINUED | OUTPATIENT
Start: 2024-10-15 | End: 2024-10-18 | Stop reason: HOSPADM

## 2024-10-15 RX ORDER — CALCIUM CARBONATE 500 MG/1
1000 TABLET, CHEWABLE ORAL DAILY PRN
Status: DISCONTINUED | OUTPATIENT
Start: 2024-10-15 | End: 2024-10-18 | Stop reason: HOSPADM

## 2024-10-15 RX ORDER — METHYLERGONOVINE MALEATE 0.2 MG/ML
INJECTION INTRAVENOUS
Status: DISPENSED
Start: 2024-10-15 | End: 2024-10-16

## 2024-10-15 RX ORDER — PROMETHAZINE HYDROCHLORIDE 25 MG/ML
25 INJECTION, SOLUTION INTRAMUSCULAR; INTRAVENOUS ONCE
Status: COMPLETED | OUTPATIENT
Start: 2024-10-15 | End: 2024-10-15

## 2024-10-15 RX ADMIN — TRANEXAMIC ACID 1000 MG: 10 INJECTION, SOLUTION INTRAVENOUS at 16:15

## 2024-10-15 RX ADMIN — SODIUM CHLORIDE, SODIUM LACTATE, POTASSIUM CHLORIDE, AND CALCIUM CHLORIDE 500 ML/HR: .6; .31; .03; .02 INJECTION, SOLUTION INTRAVENOUS at 13:18

## 2024-10-15 RX ADMIN — MAGNESIUM SULFATE HEPTAHYDRATE 2 G/HR: 40 INJECTION, SOLUTION INTRAVENOUS at 12:29

## 2024-10-15 RX ADMIN — BENZOCAINE AND LEVOMENTHOL 1 APPLICATION: 200; 5 SPRAY TOPICAL at 18:28

## 2024-10-15 RX ADMIN — PROMETHAZINE HYDROCHLORIDE 25 MG: 25 INJECTION INTRAMUSCULAR; INTRAVENOUS at 00:18

## 2024-10-15 RX ADMIN — MAGNESIUM SULFATE HEPTAHYDRATE 2 G/HR: 40 INJECTION, SOLUTION INTRAVENOUS at 01:46

## 2024-10-15 RX ADMIN — SODIUM CHLORIDE, SODIUM LACTATE, POTASSIUM CHLORIDE, AND CALCIUM CHLORIDE 50 ML/HR: .6; .31; .03; .02 INJECTION, SOLUTION INTRAVENOUS at 16:48

## 2024-10-15 RX ADMIN — MAGNESIUM SULFATE HEPTAHYDRATE 2 G/HR: 40 INJECTION, SOLUTION INTRAVENOUS at 16:48

## 2024-10-15 RX ADMIN — SODIUM CHLORIDE, SODIUM LACTATE, POTASSIUM CHLORIDE, AND CALCIUM CHLORIDE 50 ML/HR: .6; .31; .03; .02 INJECTION, SOLUTION INTRAVENOUS at 02:19

## 2024-10-15 RX ADMIN — Medication 2 MILLI-UNITS/MIN: at 00:45

## 2024-10-15 RX ADMIN — MORPHINE SULFATE 2 MG: 2 INJECTION, SOLUTION INTRAMUSCULAR; INTRAVENOUS at 00:18

## 2024-10-15 RX ADMIN — ROPIVACAINE HYDROCHLORIDE: 2 INJECTION, SOLUTION EPIDURAL; INFILTRATION at 11:30

## 2024-10-15 RX ADMIN — ONDANSETRON 4 MG: 2 INJECTION INTRAMUSCULAR; INTRAVENOUS at 11:26

## 2024-10-15 RX ADMIN — ROPIVACAINE HYDROCHLORIDE 10 ML/HR: 2 INJECTION, SOLUTION EPIDURAL; INFILTRATION at 02:23

## 2024-10-15 RX ADMIN — ROPIVACAINE HYDROCHLORIDE: 2 INJECTION, SOLUTION EPIDURAL; INFILTRATION at 02:20

## 2024-10-15 RX ADMIN — IBUPROFEN 600 MG: 600 TABLET, FILM COATED ORAL at 17:59

## 2024-10-15 RX ADMIN — ROPIVACAINE HYDROCHLORIDE 10 ML: 2 INJECTION, SOLUTION EPIDURAL; INFILTRATION at 02:16

## 2024-10-15 NOTE — ANESTHESIA PROCEDURE NOTES
Epidural Block    Patient location during procedure: OB/L&D  Start time: 10/15/2024 2:16 AM  Reason for block: at surgeon's request  Staffing  Performed by: Mac Carrion DO  Authorized by: Mac Carrion DO    Preanesthetic Checklist  Completed: patient identified, IV checked, site marked, risks and benefits discussed, surgical consent, monitors and equipment checked, pre-op evaluation and timeout performed  Epidural  Patient position: sitting  Prep: Betadine  Sedation Level: no sedation  Patient monitoring: continuous pulse oximetry and frequent blood pressure checks  Approach: midline  Location: lumbar, L3-4  Injection technique: EVERARDO air  Needle  Needle type: Tuohy   Needle gauge: 18 G  Catheter type: side hole  Catheter size: 20 G  Catheter securement method: tape  Test dose: negativeropivacaine (NAROPIN) 0.2% injection 10 mL - Epidural   10 mL - 10/15/2024 2:16:00 AM

## 2024-10-15 NOTE — ANESTHESIA POSTPROCEDURE EVALUATION
"Post-Op Assessment Note    CV Status:  Stable    Pain management: adequate      Post-op block assessment: catheter intact and no complications   Mental Status:  Alert and awake   Hydration Status:  Euvolemic   PONV Controlled:  Controlled   Airway Patency:  Patent     Post Op Vitals Reviewed: Yes    No anethesia notable event occurred.    Staff: Anesthesiologist           Last Filed PACU Vitals:  Vitals Value Taken Time   Temp     Pulse     BP     Resp     SpO2         Modified Vikram:  No data recorded  BP 98/55   Pulse (!) 107   Temp 100.2 °F (37.9 °C) (Oral)   Resp 19   Ht 5' 1\" (1.549 m)   Wt 82 kg (180 lb 12.8 oz)   LMP 01/13/2024 (Exact Date)   SpO2 99%   BMI 34.16 kg/m²       "

## 2024-10-15 NOTE — OB LABOR/OXYTOCIN SAFETY PROGRESS
Oxytocin Safety Progress Check Note - Brian Guthrie 19 y.o. female MRN: [id number]    Unit/Bed#: -01 Encounter: [id number]    Dose (jose-units/min) Oxytocin: 8 jose-units/min  Contraction Frequency (minutes): 2-4  Contraction Intensity: Mild  Uterine Activity Characteristics: Irregular  Cervical Dilation: 4        Cervical Effacement: 50  Fetal Station: -3  Baseline Rate (FHR): 145 bpm  Fetal Heart Rate (FHT): 140 BPM  FHR Category: cat 1               Vital Signs:   Vitals:    10/15/24 0415   BP: 99/57   Pulse: 86   Resp:    Temp:    SpO2:        Notes/comments:       SVE deferred, continue pitocin titration. Cat 1 tracing.   Rosa Faye DO 10/15/2024 4:47 AM

## 2024-10-15 NOTE — OB LABOR/OXYTOCIN SAFETY PROGRESS
Oxytocin Safety Progress Check Note - Brain Guthrie 19 y.o. female MRN: [id number]    Unit/Bed#: -01 Encounter: [id number]    Dose (jose-units/min) Oxytocin: 22 jose-units/min  Contraction Frequency (minutes): 2-3  Contraction Intensity: Moderate/Strong  Uterine Activity Characteristics: Irregular  Cervical Dilation: 7-8        Cervical Effacement: 90  Fetal Station: -1  Baseline Rate (FHR): 150 bpm  Fetal Heart Rate (FHT): 140 BPM  FHR Category: 2               Vital Signs:   Vitals:    10/15/24 1400   BP: 128/79   Pulse: 100   Resp:    Temp:    SpO2:        Notes/comments:   SVE as noted above. FHT cat 2. Patient with minimal variability and recurrent decelerations. Patient is also on magnesium. Contractions were difficult to monitor, so an IUPC was placed. Fetal scalp stimulation was also performed. Pit currently at 22. There was some dark tinged urine in her valdivia catheter.     Azeem Moreira MD 10/15/2024 2:29 PM

## 2024-10-15 NOTE — OB LABOR/OXYTOCIN SAFETY PROGRESS
Oxytocin Safety Progress Check Note - Brian Guthrie 19 y.o. female MRN: [id number]    Unit/Bed#: -01 Encounter: [id number]    Dose (jose-units/min) Oxytocin: 2 jose-units/min  Contraction Frequency (minutes): 3-4  Contraction Intensity: Mild  Uterine Activity Characteristics: Occasional  Cervical Dilation: 4        Cervical Effacement: 50  Fetal Station: -3  Baseline Rate (FHR): 140 bpm  Fetal Heart Rate (FHT): 138 BPM  FHR Category: cat  for occasional late decels after epidural               Vital Signs:   Vitals:    10/15/24 0300   BP: 112/63   Pulse: 75   Resp:    Temp:    SpO2:        Notes/comments:     SVE as above, patient making good change after epidural. Cat 2 for occasional late decels, will reposition    Rosa Faye DO 10/15/2024 3:10 AM

## 2024-10-15 NOTE — OB LABOR/OXYTOCIN SAFETY PROGRESS
Labor Progress Note - Brian BELEN Mcfarlaneaftab 19 y.o. female MRN: [id number]    Unit/Bed#: -01 Encounter: [id number]       Contraction Frequency (minutes): 3-5  Contraction Intensity: Mild  Uterine Activity Characteristics: Irregular  Cervical Dilation: Closed        Cervical Effacement: 20  Fetal Station: Ballotable  Baseline Rate (FHR): 130 bpm  Fetal Heart Rate (FHT): 146 BPM  FHR Category: cat 1               Vital Signs:   Vitals:    10/14/24 1950   BP: 125/66   Pulse: 82   Resp:    Temp:    SpO2:        Notes/comments:   Patient not feeling contraactions, sleeping. Will give second dose oral cyto    Rosa Faye DO 10/14/2024 8:07 PM

## 2024-10-15 NOTE — L&D DELIVERY NOTE
Operative Vaginal Delivery Procedure Note      Delivering Physician:  Juliana Santos     Assistant:  Omid Diaz MD     Preoperative diagnosis:  Suspicion of potential or immediate fetal compromise    Patient counseling, indication discussed, questions answered and patient consented to operative delivery.    Discussed with patient associated risks of vacuum delivery.  Discussed with patient potential  complications such as intracranial hemorrhage, intraventricular hemorrhage and subgaleal hemorrhage.  Other potential complications such as fetal scalp abrasions and lacerations, retinal hemorrhage, shoulder dystocia resulting in brachial plexus injury. Discussed with patient maternal risks such as lower genital tract laceration, vulvar and vaginal hematomas, urinary tract injury and anal sphincter injury. Patient appears to understand potential risks and agrees to proceed with vacuum assisted vaginal delivery. She was made aware that in case of a failed vacuum delivery, that a  would be indicated. Also discussed with patient alternative of abandoning trial of operative delivery and proceeding directly to a  which she declines for now.    Description of procedure:   Patient was complete and began pushing at 1456. After pushing for approximately 12 minutes with good effort it was recommended to expedite delivery via vacuum assist due to persistent category 2 FHT, due to intermittent fetal tachycardia to 180's, periods of minimal variability and deep variable decelerations.     Examination findings:  EFW 7 lbs  Fetal Station (+2/5)  Fetal Position: SAY  Cervix completely dilated and effaced  Maternal-fetal size appropriate for application  Bladder empty via valdivia catheter       Cup placement:  Flexion point identified, cup choice appropriate for application site, maternal tissue excluded from vacuum cup.    Total time of vacuum application: 20 minutes (excluding release of vacuum  "in between contractions)  Number of pulls (contractions): 8  Number of involuntary releases/\"pop-offs\": 1  Traction released between contractions - yes   There was moderate variability of the FHT noted between each contraction. Contractions were at times infrequent up to 5 minutes apart despite use of pitocin. There was progressive fetal descent noted with each pull.  The vacuum and tension was released in between contractions.       Post-Procedure Evaluation:    Cord blood gases, arterial and venous obtained.    Umbilical Cord Venous Blood Gas:  Results from last 7 days   Lab Units 10/15/24  1530   PH COV  7.248   PCO2 COV mm HG 43.7*   HCO3 COV mmol/L 18.6   BASE EXC COV mmol/L -8.4*   O2 CT CD VB mL/dL 9.9   O2 HGB, VENOUS CORD % 41.0       Umbilical Cord Arterial Blood Gas:  Results from last 7 days   Lab Units 10/15/24  1530   PH COA  7.171*   PCO2 COA  55.4   PO2 COA mm HG 14.4   HCO3 COA mmol/L 19.8   BASE EXC COA mmol/L -9.4*   O2 CONTENT CORD ART ml/dl 4.9   O2 HGB, ARTERIAL CORD % 20.9         Report of Procedure:    Cervix was completely dilated, patient was comfortable with epidural anesthesia. Bladder was emptied with straight catheter. Position of the fetal head was appropriately determined.    The vacuum was applied over the flexion median point. This was identified by identifying the posterior fontanelle and moving the finger 3 cm anteriorly.     A finger sweep ensured that there was no maternal tissue trapped between cup and fetal head. The vacuum was then increased initially to 100 mmHg and again a repeat examination revealed no maternal tissue has been drawn under the cup.    Once contraction begins,the vacuum pressure was raised to 450-550 mmHg. Traction was applied in the direction of the pelvic axis and perineum. Traction was discontinued between pulls. There was fetal descent noted with each pull.    There was one involuntary pop off.     After delivery of the fetal head, the suction cup was " released and the cup was eased off the fetal scalp.    With gentle downward traction the anterior shoulders delivered together with maternal expulsive efforts. With gentle upward traction the posterior shoulders together with maternal expulsive efforts delivered as well as the rest of the body without difficulty.     Infant was bulb suctioned at delivery. Delayed clamping of umbilical cord was performed. Infant was placed on mother's abdomen, and later removed to the warmer by the nurse.      Cord blood gases, both arterial and venous gases and cord blood was sent for analysis.  Intact placenta with a normal configuration 3-vessel cord was delivered spontaneously.    The uterus was aggressively massaged. All clots were cleared from the lower uterine segment. Oxytocin infusion was started to control postpartum bleeding. Inspection of the perineum revealed a left periurethral and left vaginal lacerations that were repaired with figure of eight and interrupted sutures using Vicryl 3.0. There was no perineal laceration requiring repair.     Sponge, instrument and needle count were correct x 2. There were no sponges left in the vagina. Final inspection of the perineum and vagina revealed no further lacerations or tears. There were no complications. Patient tolerated procedure well.      I was present and performed all critical aspects of the above procedure.    Juliana Santos

## 2024-10-15 NOTE — OB LABOR/OXYTOCIN SAFETY PROGRESS
Labor Progress Note - Brian Guthrie 19 y.o. female MRN: [id number]    Unit/Bed#: -01 Encounter: [id number]       Contraction Frequency (minutes): 2  Contraction Intensity: Mild  Uterine Activity Characteristics: Irregular  Cervical Dilation: 1        Cervical Effacement: 50  Fetal Station: -3  Baseline Rate (FHR): 125 bpm  Fetal Heart Rate (FHT): 160 BPM  FHR Category: cat 1               Vital Signs:   Vitals:    10/14/24 2305   BP: 122/65   Pulse: 78   Resp:    Temp:    SpO2:        Notes/comments:     PROCEDURE:  VALDIVIA BALLOON PLACEMENT    A 24F valdivia with a 30cc balloon was selected, a speculum examination was performed and the cervix was located. A valdivia balloon was introduced over sterile gloved hands. Balloon advanced through cervix with a ringed forceps beyond the internal cervical os. A small amount amount of sterile saline solution was instilled in the balloon to confirm placement. Placement was confirmed to be beyond the internal cervical os. A total of 60cc of sterile saline solution was placed into the balloon. Pt tolerated well. Instructions left with RN to place valdivia to gravity with a 1L bag of IV fluid. Notify DO/MD when valdivia dislodged.    Will start pitocin. Patient request IV pain meds, ordered morphine/phenergan    Rosa Faye DO 10/15/2024 12:04 AM

## 2024-10-15 NOTE — ANESTHESIA PREPROCEDURE EVALUATION
Procedure:  LABOR ANALGESIA    Relevant Problems   CARDIO   (+) Pre-eclampsia with severe features      GYN   (+) 39 weeks gestation of pregnancy      PULMONARY   (+) Asthma      Other   (+) Elevated blood pressure reading without diagnosis of hypertension        Physical Exam    Airway    Mallampati score: II         Dental       Cardiovascular  Rhythm: regular, Rate: normal    Pulmonary   Breath sounds clear to auscultation    Other Findings  post-pubertal.      Anesthesia Plan  ASA Score- 3     Anesthesia Type- epidural with ASA Monitors.         Additional Monitors:     Airway Plan:            Plan Factors-Exercise tolerance (METS): <4 METS.    Chart reviewed.   Existing labs reviewed. Patient summary reviewed.    Patient is not a current smoker.              Induction- intravenous.    Postoperative Plan-     Perioperative Resuscitation Plan - Level 1 - Full Code.       Informed Consent- Anesthetic plan and risks discussed with patient.

## 2024-10-15 NOTE — OB LABOR/OXYTOCIN SAFETY PROGRESS
Oxytocin Safety Progress Check Note - Brian Guthrie 19 y.o. female MRN: [id number]    Unit/Bed#: -01 Encounter: [id number]    Dose (jose-units/min) Oxytocin: 14 jose-units/min  Contraction Frequency (minutes): 1-4  Contraction Intensity: Moderate  Uterine Activity Characteristics: Irregular  Cervical Dilation: 5-6        Cervical Effacement: 80  Fetal Station: -1  Baseline Rate (FHR): 145 bpm  Fetal Heart Rate (FHT): 140 BPM  FHR Category: I               Vital Signs:   Vitals:    10/15/24 1032   BP: 135/64   Pulse: 92   Resp:    Temp:    SpO2:        Notes/comments:   Patient is feeling well. FHT cat II with minimal variability and very subtle later decelerations at times on magnesium. SVE as above. Maternal reposition to the side and continue to monitor.       Mariana Trotter MD 10/15/2024 10:57 AM

## 2024-10-15 NOTE — OB LABOR/OXYTOCIN SAFETY PROGRESS
Oxytocin Safety Progress Check Note - Brian Guthrie 19 y.o. female MRN: [id number]    Unit/Bed#: -01 Encounter: [id number]    Dose (jose-units/min) Oxytocin: 22 jose-units/min  Contraction Frequency (minutes): 2-3  Contraction Intensity: Moderate/Strong  Uterine Activity Characteristics: Irregular  Cervical Dilation: 10  Dilation Complete Date: 10/15/24  Dilation Complete Time: 1445  Cervical Effacement: 100  Fetal Station: 1  Baseline Rate (FHR): 150 bpm  Fetal Heart Rate (FHT): 140 BPM  FHR Category: 2               Vital Signs:   Vitals:    10/15/24 1430   BP: 132/77   Pulse:    Resp:    Temp: 100.2 °F (37.9 °C)   SpO2:        Notes/comments:   SVE as noted above. FHT cat 2 with recurrent decelerations. Anticipate  soon      Azeem Moreira MD 10/15/2024 2:49 PM

## 2024-10-15 NOTE — OB LABOR/OXYTOCIN SAFETY PROGRESS
Oxytocin Safety Progress Check Note - Brian Guthrie 19 y.o. female MRN: [id number]    Unit/Bed#: -01 Encounter: [id number]    Dose (jose-units/min) Oxytocin: 14 jose-units/min  Contraction Frequency (minutes): 1-4  Contraction Intensity: Moderate  Uterine Activity Characteristics: Irregular  Cervical Dilation: 4        Cervical Effacement: 70  Fetal Station: -3  Baseline Rate (FHR): 150 bpm  Fetal Heart Rate (FHT): 140 BPM  FHR Category: II minimal variability on magnesium               Vital Signs:   Vitals:    10/15/24 0717   BP: 124/75   Pulse: 80   Resp:    Temp:    SpO2:        Notes/comments:   SVE deferred. FHT Cat II for periods of minimal variability on magnesium. No decelerations. Contractions q2-4 minutes. Continue pitocin titration.     Francoise Bills MD 10/15/2024 9:47 AM

## 2024-10-15 NOTE — DISCHARGE SUMMARY
Discharge Summary - OB/GYN   Brian Guthrie 19 y.o. female MRN: [id number]  Unit/Bed#: -01 Encounter: [id number]      Admission Date: 10/14/2024     Discharge Date: 10/18/2024    Admitting Diagnosis:   1. Pregnancy at 39w3d  2. Pre-E w/SF    Discharge Diagnosis:   Same, delivered    Procedures: vacuum, low    Attending: Arnie Frazier MD    Hospital Course:     Brian Guthrie is a 19 y.o.  at 39w3d wks who was initially admitted for IOL for Pre-e w/SF with elevated AST/ALT and mildly elevated Bps. She received 2 doses of cytotec. She then received FB and pitocin. She had an epidural for pain control. She had SROM for clear fluid. An IUPC was placed to identity contractions. She continued to make change to completely dilated. FHT was noted have deep variable decelerations and she began pushing. After pushing for approximately 12 minutes with good effort it was recommended to expedite delivery via vacuum assist due to persistent category 2 FHT, due to intermittent fetal tachycardia to 180's, periods of minimal variability and deep variable decelerations.      She delivered a viable male  on 10/15/24 at 1528. Weight 6lbs 2.8oz via vacuum, low. Apgars were 3 (1 min) and 6 (5 min), and 8 at 10 mins.   was transferred to  nursery. Patient tolerated the procedure well and was transferred to recovery in stable condition.     Her post-partum course was complicated by blood pressure management secondary to preeclampsia with severe features. She was enrolled in the OB Postpartum Blood Pressure Monitoring Program, and her blood pressure was stable on Procardia XL 30 mg daily.  Her post-partum pain was well controlled with oral analgesics.    On day of discharge, she was ambulating and able to reasonably perform all ADLs. She was voiding and had appropriate bowel function. Pain was well controlled. She was discharged home on post-partum day #3 without complications. Patient was  instructed to follow up with her OB as an outpatient and was given appropriate warnings to call provider if she develops signs of infection or uncontrolled pain.    Complications: none apparent    Condition at discharge: good     Discharge instructions/Information to patient and family:   See after visit summary for information provided to patient and family.      Provisions for Follow-Up Care:  See after visit summary for information related to follow-up care and any pertinent home health orders.      Disposition: Home    Planned Readmission: No    Discharge Medications:  For a complete list of the patient's medications, please refer to her med rec.

## 2024-10-15 NOTE — OB LABOR/OXYTOCIN SAFETY PROGRESS
Oxytocin Safety Progress Check Note - Brian Guthrie 19 y.o. female MRN: [id number]    Unit/Bed#: -01 Encounter: [id number]    Dose (jose-units/min) Oxytocin: 18 jose-units/min  Contraction Frequency (minutes): 2 - 7  Contraction Intensity: Moderate/Strong  Uterine Activity Characteristics: Irregular  Cervical Dilation: 6        Cervical Effacement: 90  Fetal Station: -1  Baseline Rate (FHR): 145 bpm  Fetal Heart Rate (FHT): 140 BPM  FHR Category: 1           Vital Signs:   Vitals:    10/15/24 1229   BP: 115/60   Pulse:    Resp:    Temp:    SpO2:        Notes/comments:   Pt is feeling increasing vaginal pressure. SVE as above. FHT cat 1 taiwo q2-4 mins. Urine concentrated, 200 cc IVF bolus to be given now. Will continue titrating pitocin    Dr. Santos aware     Mara Diaz MD 10/15/2024 12:38 PM

## 2024-10-16 PROBLEM — Z37.9 VACUUM-ASSISTED VAGINAL DELIVERY: Status: ACTIVE | Noted: 2024-10-16

## 2024-10-16 PROBLEM — Z3A.39 39 WEEKS GESTATION OF PREGNANCY: Status: RESOLVED | Noted: 2024-09-27 | Resolved: 2024-10-16

## 2024-10-16 PROBLEM — Z34.90 ENCOUNTER FOR INDUCTION OF LABOR: Status: RESOLVED | Noted: 2024-10-14 | Resolved: 2024-10-16

## 2024-10-16 LAB
ALBUMIN SERPL BCG-MCNC: 3.1 G/DL (ref 3.5–5)
ALBUMIN SERPL BCG-MCNC: 3.2 G/DL (ref 3.5–5)
ALBUMIN SERPL BCG-MCNC: 3.3 G/DL (ref 3.5–5)
ALP SERPL-CCNC: 176 U/L (ref 34–104)
ALP SERPL-CCNC: 178 U/L (ref 34–104)
ALP SERPL-CCNC: 178 U/L (ref 34–104)
ALT SERPL W P-5'-P-CCNC: 121 U/L (ref 7–52)
ALT SERPL W P-5'-P-CCNC: 124 U/L (ref 7–52)
ALT SERPL W P-5'-P-CCNC: 143 U/L (ref 7–52)
ANION GAP SERPL CALCULATED.3IONS-SCNC: 4 MMOL/L (ref 4–13)
ANION GAP SERPL CALCULATED.3IONS-SCNC: 4 MMOL/L (ref 4–13)
ANION GAP SERPL CALCULATED.3IONS-SCNC: 6 MMOL/L (ref 4–13)
AST SERPL W P-5'-P-CCNC: 130 U/L (ref 13–39)
AST SERPL W P-5'-P-CCNC: 87 U/L (ref 13–39)
AST SERPL W P-5'-P-CCNC: 96 U/L (ref 13–39)
BILIRUB SERPL-MCNC: 0.35 MG/DL (ref 0.2–1)
BILIRUB SERPL-MCNC: 0.39 MG/DL (ref 0.2–1)
BILIRUB SERPL-MCNC: 0.43 MG/DL (ref 0.2–1)
BUN SERPL-MCNC: 10 MG/DL (ref 5–25)
BUN SERPL-MCNC: 8 MG/DL (ref 5–25)
BUN SERPL-MCNC: 8 MG/DL (ref 5–25)
CALCIUM ALBUM COR SERPL-MCNC: 7.7 MG/DL (ref 8.3–10.1)
CALCIUM ALBUM COR SERPL-MCNC: 7.8 MG/DL (ref 8.3–10.1)
CALCIUM ALBUM COR SERPL-MCNC: 7.9 MG/DL (ref 8.3–10.1)
CALCIUM SERPL-MCNC: 7.1 MG/DL (ref 8.4–10.2)
CALCIUM SERPL-MCNC: 7.1 MG/DL (ref 8.4–10.2)
CALCIUM SERPL-MCNC: 7.3 MG/DL (ref 8.4–10.2)
CHLORIDE SERPL-SCNC: 102 MMOL/L (ref 96–108)
CHLORIDE SERPL-SCNC: 103 MMOL/L (ref 96–108)
CHLORIDE SERPL-SCNC: 104 MMOL/L (ref 96–108)
CO2 SERPL-SCNC: 24 MMOL/L (ref 21–32)
CO2 SERPL-SCNC: 28 MMOL/L (ref 21–32)
CO2 SERPL-SCNC: 30 MMOL/L (ref 21–32)
CREAT SERPL-MCNC: 0.68 MG/DL (ref 0.6–1.3)
CREAT SERPL-MCNC: 0.75 MG/DL (ref 0.6–1.3)
CREAT SERPL-MCNC: 0.77 MG/DL (ref 0.6–1.3)
ERYTHROCYTE [DISTWIDTH] IN BLOOD BY AUTOMATED COUNT: 13.3 % (ref 11.6–15.1)
ERYTHROCYTE [DISTWIDTH] IN BLOOD BY AUTOMATED COUNT: 13.4 % (ref 11.6–15.1)
ERYTHROCYTE [DISTWIDTH] IN BLOOD BY AUTOMATED COUNT: 13.6 % (ref 11.6–15.1)
GFR SERPL CREATININE-BSD FRML MDRD: 111 ML/MIN/1.73SQ M
GFR SERPL CREATININE-BSD FRML MDRD: 115 ML/MIN/1.73SQ M
GFR SERPL CREATININE-BSD FRML MDRD: 126 ML/MIN/1.73SQ M
GLUCOSE SERPL-MCNC: 165 MG/DL (ref 65–140)
GLUCOSE SERPL-MCNC: 95 MG/DL (ref 65–140)
GLUCOSE SERPL-MCNC: 98 MG/DL (ref 65–140)
HCT VFR BLD AUTO: 28.6 % (ref 34.8–46.1)
HCT VFR BLD AUTO: 29 % (ref 34.8–46.1)
HCT VFR BLD AUTO: 30.3 % (ref 34.8–46.1)
HGB BLD-MCNC: 10.3 G/DL (ref 11.5–15.4)
HGB BLD-MCNC: 9.5 G/DL (ref 11.5–15.4)
HGB BLD-MCNC: 9.7 G/DL (ref 11.5–15.4)
MAGNESIUM SERPL-MCNC: 5.6 MG/DL (ref 1.9–2.7)
MAGNESIUM SERPL-MCNC: 5.9 MG/DL (ref 1.9–2.7)
MAGNESIUM SERPL-MCNC: 6.1 MG/DL (ref 1.9–2.7)
MCH RBC QN AUTO: 28.2 PG (ref 26.8–34.3)
MCH RBC QN AUTO: 28.5 PG (ref 26.8–34.3)
MCH RBC QN AUTO: 29.1 PG (ref 26.8–34.3)
MCHC RBC AUTO-ENTMCNC: 33.2 G/DL (ref 31.4–37.4)
MCHC RBC AUTO-ENTMCNC: 33.4 G/DL (ref 31.4–37.4)
MCHC RBC AUTO-ENTMCNC: 34 G/DL (ref 31.4–37.4)
MCV RBC AUTO: 84 FL (ref 82–98)
MCV RBC AUTO: 84 FL (ref 82–98)
MCV RBC AUTO: 88 FL (ref 82–98)
PLATELET # BLD AUTO: 289 THOUSANDS/UL (ref 149–390)
PLATELET # BLD AUTO: 291 THOUSANDS/UL (ref 149–390)
PLATELET # BLD AUTO: 313 THOUSANDS/UL (ref 149–390)
PMV BLD AUTO: 10.2 FL (ref 8.9–12.7)
PMV BLD AUTO: 10.3 FL (ref 8.9–12.7)
PMV BLD AUTO: 10.4 FL (ref 8.9–12.7)
POTASSIUM SERPL-SCNC: 3.9 MMOL/L (ref 3.5–5.3)
POTASSIUM SERPL-SCNC: 4.2 MMOL/L (ref 3.5–5.3)
POTASSIUM SERPL-SCNC: 4.3 MMOL/L (ref 3.5–5.3)
PROT SERPL-MCNC: 5.8 G/DL (ref 6.4–8.4)
PROT SERPL-MCNC: 6 G/DL (ref 6.4–8.4)
PROT SERPL-MCNC: 6.1 G/DL (ref 6.4–8.4)
RBC # BLD AUTO: 3.26 MILLION/UL (ref 3.81–5.12)
RBC # BLD AUTO: 3.44 MILLION/UL (ref 3.81–5.12)
RBC # BLD AUTO: 3.61 MILLION/UL (ref 3.81–5.12)
SODIUM SERPL-SCNC: 132 MMOL/L (ref 135–147)
SODIUM SERPL-SCNC: 136 MMOL/L (ref 135–147)
SODIUM SERPL-SCNC: 137 MMOL/L (ref 135–147)
WBC # BLD AUTO: 17.39 THOUSAND/UL (ref 4.31–10.16)
WBC # BLD AUTO: 18.27 THOUSAND/UL (ref 4.31–10.16)
WBC # BLD AUTO: 24.21 THOUSAND/UL (ref 4.31–10.16)

## 2024-10-16 PROCEDURE — 83735 ASSAY OF MAGNESIUM: CPT

## 2024-10-16 PROCEDURE — 85027 COMPLETE CBC AUTOMATED: CPT

## 2024-10-16 PROCEDURE — 80053 COMPREHEN METABOLIC PANEL: CPT

## 2024-10-16 PROCEDURE — 99024 POSTOP FOLLOW-UP VISIT: CPT | Performed by: OBSTETRICS & GYNECOLOGY

## 2024-10-16 RX ADMIN — BENZOCAINE AND LEVOMENTHOL 1 APPLICATION: 200; 5 SPRAY TOPICAL at 19:08

## 2024-10-16 RX ADMIN — IBUPROFEN 600 MG: 600 TABLET, FILM COATED ORAL at 18:08

## 2024-10-16 RX ADMIN — SODIUM CHLORIDE, SODIUM LACTATE, POTASSIUM CHLORIDE, AND CALCIUM CHLORIDE 50 ML/HR: .6; .31; .03; .02 INJECTION, SOLUTION INTRAVENOUS at 11:46

## 2024-10-16 RX ADMIN — MAGNESIUM SULFATE HEPTAHYDRATE 2 G/HR: 40 INJECTION, SOLUTION INTRAVENOUS at 03:43

## 2024-10-16 RX ADMIN — IBUPROFEN 600 MG: 600 TABLET, FILM COATED ORAL at 11:42

## 2024-10-16 RX ADMIN — IBUPROFEN 600 MG: 600 TABLET, FILM COATED ORAL at 06:11

## 2024-10-16 RX ADMIN — MAGNESIUM SULFATE HEPTAHYDRATE 2 G/HR: 40 INJECTION, SOLUTION INTRAVENOUS at 13:23

## 2024-10-16 RX ADMIN — PRENATAL VIT W/ FE FUMARATE-FA TAB 27-0.8 MG 1 TABLET: 27-0.8 TAB at 09:20

## 2024-10-16 RX ADMIN — IBUPROFEN 600 MG: 600 TABLET, FILM COATED ORAL at 00:44

## 2024-10-16 NOTE — PROGRESS NOTES
"Progress Note - OB/GYN  Brian Guthrie 19 y.o. female MRN: [id number]  Unit/Bed#:  423-01 Encounter: [id number]    Assessment and Plan     Brian Guthrie is a patient of: [facility] She is PPD# 1 s/p   VAVD . Recovering well and is stable       Vacuum-assisted vaginal delivery  Assessment & Plan  Continue routine post partum care  Continue oral analgesics for pain control  Encourage ambulation  Encourage feeding and family bonding; baby is in NICU  Contraception: undecided  Anticipate discharge PPD 2    * Pre-eclampsia with severe features  Assessment & Plan  Elevated blood pressures 4 hours apart in the absence of proteinuria, with elevated AST//116.  CBC wnl, CMP notable for AST//116; P/C 0.2  Mag started on 10/14/24 @1515  Monitor BP's    Systolic (12hrs), Av , Min:124 , Max:153   Diastolic (12hrs), Av, Min:77, Max:99    Disposition    - Anticipate discharge home on PPD# 2      Subjective/Objective     Chief Complaint: Postpartum State     Subjective:    Brian Guthrie is PPD# 1 s/p   VAVD . She has no current complaints.  Pain is well controlled. She is recovering well and is stable.     Breastfeeding:  yes, attempting with assistance of NICU    Tolerating PO: yes  Nausea or Vomiting: no  Voiding: yes  Flatus: yes; has had no bowel movement.   Ambulating: yes  Chest pain: no  Shortness of breath: no  Leg pain: no  Lochia: appropriate     Vitals:   /77 (BP Location: Right arm)   Pulse 75   Temp 97.6 °F (36.4 °C) (Oral)   Resp 16   Ht 5' 1\" (1.549 m)   Wt 82 kg (180 lb 12.8 oz)   LMP 2024 (Exact Date)   SpO2 98%   Breastfeeding Yes   BMI 34.16 kg/m²       Intake/Output Summary (Last 24 hours) at 10/16/2024 0937  Last data filed at 10/15/2024 1845  Gross per 24 hour   Intake --   Output 1096 ml   Net -1096 ml       Invasive Devices       Peripheral Intravenous Line  Duration             Peripheral IV 10/15/24 Dorsal (posterior);Left Hand <1 day              "       Physical Exam:   GEN: Brian Guthrie appears well, alert and oriented x 3, pleasant and cooperative   CARDIO: RRR, no murmurs or rubs  RESP:  CTAB, no wheezes or rales  ABDOMEN: soft, no tenderness, no distention, fundus @ U  EXTREMITIES:non tender, no erythema, b/l Marry's sign negative      Labs:     Hemoglobin   Date Value Ref Range Status   10/16/2024 9.7 (L) 11.5 - 15.4 g/dL Final   10/16/2024 10.3 (L) 11.5 - 15.4 g/dL Final     WBC   Date Value Ref Range Status   10/16/2024 18.27 (H) 4.31 - 10.16 Thousand/uL Final   10/16/2024 24.21 (H) 4.31 - 10.16 Thousand/uL Final     Platelets   Date Value Ref Range Status   10/16/2024 291 149 - 390 Thousands/uL Final   10/16/2024 313 149 - 390 Thousands/uL Final     Creatinine   Date Value Ref Range Status   10/16/2024 0.68 0.60 - 1.30 mg/dL Final     Comment:     Standardized to IDMS reference method   10/16/2024 0.77 0.60 - 1.30 mg/dL Final     Comment:     Standardized to IDMS reference method   09/13/2021 0.85 0.59 - 0.86 mg/dL Final   02/10/2021 0.72 0.59 - 0.86 mg/dL Final     AST   Date Value Ref Range Status   10/16/2024 96 (H) 13 - 39 U/L Final   10/16/2024 130 (H) 13 - 39 U/L Final   09/13/2021 22 <41 U/L Final   02/10/2021 28 <41 U/L Final     ALT   Date Value Ref Range Status   10/16/2024 124 (H) 7 - 52 U/L Final     Comment:     Specimen collection should occur prior to Sulfasalazine administration due to the potential for falsely depressed results.    10/16/2024 143 (H) 7 - 52 U/L Final     Comment:     Specimen collection should occur prior to Sulfasalazine administration due to the potential for falsely depressed results.    09/13/2021 24 <56 U/L Final   02/10/2021 38 <56 U/L Final          ANA LAURA Anderson  10/16/2024  9:37 AM          I discussed the case with the Advanced Practice Provider and reviewed her note, prescribed medication, and orders placed.  I supervised the Provider and I agree with the management plan as it was presented to  me.  I was present in the hospital, but did not examine the patient.  Continue to monitor BP closely.    Elaine Villatoro MD 10/16/24

## 2024-10-16 NOTE — LACTATION NOTE
After Visit Summary   2017    Yumiko Abrams    MRN: [id number]           Patient Information     Date Of Birth          1985        Visit Information        Provider Department      2017 7:10 PM Sherlyn James MD [facility]        Today's Diagnoses     Back pain    -  1    Acute pelvic inflammatory disease (PID)        Status post elective         Yeast infection of the vagina          Care Instructions      What Is Pelvic Inflammatory Disease (PID)?  PID is an infection of the reproductive organs. Left untreated, it can cause severe damage to the body. PID sometimes causes symptoms bad enough to send you to the emergency room. But in many cases, PID is a  silent  infection with few or no symptoms. Rest assured that the infection can be treated. This can help prevent lasting damage.  Who gets PID?  Although PID can happen at any age, most women get it in their late teens or early 20s. Many don t know they have PID until years later. The longer a woman is infected, the higher her risk of further health problems. The more sexual partners you have the higher the risk. You are also more likely to get PID if you have had it before.   What are the symptoms?  If PID symptoms do happen, they are similar to those of many other health problems. This can make PID hard to detect. Symptoms can include:    Pelvic pain    Pain during sex, or bleeding afterward    Painful or frequent urination    Fever, chills, or other flu-like symptoms    Vaginal discharge with a bad odor    Abnormal vaginal bleeding     Nausea and vomiting    Pain in the upper right abdomen  How did I get PID?    PID happens when certain bacteria infect the reproductive organs. Often, this happens because you are infected with an STD (sexually transmitted disease). In a few cases, women develop PID while using an IUD (intrauterine device) for birth control. This usually happens within the first 3  This note was copied from a baby's chart.  CONSULT - LACTATION  Baby Boy Guthrie (Taliejia) 1 days male MRN: [id number]    [facility] NICU Room / Bed: NICU_07/NICU_ Encounter: [id number]    Maternal Information     MOTHER:  Brian Guthrie  Maternal Age: 19 y.o.  OB History: # 1 - Date: 10/15/24, Sex: Male, Weight: 2800 g (6 lb 2.8 oz), GA: 39w3d, Type: Vaginal, Vacuum (Extractor), Apgar1: 3, Apgar5: 6, Living: Living, Birth Comments: None   Previouse breast reduction surgery? No    Lactation history:   Has patient previously breast fed: No   How long had patient previously breast fed:     Previous breast feeding complications:     No past surgical history on file.    Birth information:  YOB: 2025   Time of birth: 3:28 PM   Sex: male   Delivery type: Vaginal, Vacuum (Extractor)   Birth Weight: 2800 g (6 lb 2.8 oz)   Percent of Weight Change: 0%     Gestational Age: 39w3d   [unfilled]    Assessment     Breast and nipple assessment:  Denies need for assistance at this time      Assessment:  as per 39 week BOY in NICU     Feeding assessment:  as per NICU     LATCH:  Latch:     Audible Swallowing:     Type of Nipple:     Comfort (Breast/Nipple):     Hold (Positioning):     LATCH Score:            Feeding recommendations:  pump every 2-3 hours for BOY in NICU     Instructions given for hands on pumping for baby in NICU.  Discussed when to start, frequency, different pumps available versus manual expression.    Discussed hygiene of hands and supplies as well as assembly, placement of flanges, size of flanged, preparing the breast and cycles and suction settings on pump.    Demonstrated use of hand pump.    Discussed labeling of milk, storage, and preparation of stored milk.    Also reviewed Ready, Set Baby, NICU & discharge breastfeeding booklet including the feeding log. Emphasized 8 or more (12) feedings in a 24 hour period, what to expect for the  number of diapers per day of life and the progression of properties of the  stooling pattern.    List of reasons to call a lactation consultant.  Feeding logs  Feeding cues  Hand expression  Baby's Second day (cluster feeding)  Breastfeeding and Your Lifestyle (Medications, Alcohol, Caffeine, Smoking, Street Drugs, Methadone)  First Two Weeks Survival Guide for Breastfeeding  Breast Changes  Physical Therapy  Storage and Handling of Breast milk  How to Keep Your Breast Pump Kit Clean  The Employed Breastfeeding Mother  Mixed feeding  Bottle feeding like breastfeeding (paced bottle feeding)  astfeeding and your lifestyle, storage and preparation of breast milk, how to keep you breast pump clean, the employed breastfeeding mother and paced bottle feeding handouts.     Booklet included Breastfeeding Resources for after discharge including access to the number for the [facility].    Encoraged nursing parent to call for assistance, questions and concerns.  Extension number for inpatient lactation support provided.        Thalia Walls RN 10/16/2024 4:45 PM   weeks after the IUD is inserted. PID is thought to happen by the following process:   1. Semen is sent from the penis into the vagina during sex. STD-causing bacteria may enter with the semen.  2. Bacteria may pass through the cervix and enter the uterus.  3. Bacteria travel from the uterus into the fallopian tubes and ovaries, which become infected.  4. The infection can leave the fallopian tubes and spread to other parts of the body.  Treatment can help  When PID is found and treated early, it can often be cured. But if not treated, PID can cause severe health problems. These include damage to the reproductive organs, pelvic pain, and infertility (problems becoming pregnant). Complications of PID can, in rare cases, even be life-threatening. This is why PID should be treated as early as possible.  Date Last Reviewed: 12/1/2016 2000-2017 [employer]. [address] [place] [address]. All rights reserved. This information is not intended as a substitute for professional medical care. Always follow your healthcare professional's instructions.        Treating Pelvic Inflammatory Disease (PID) with Medicines     Be sure to take your antibiotics every day until they re finished.   As soon as PID has been diagnosed, it needs to be treated with antibiotics. Two or more types of antibiotics may be taken at the same time. This ensures that all the bacteria are killed. It's very important to take all of your medicine as prescribed, or the infection may not go away.  For mild cases  Mild cases of PID can be treated at home. Antibiotic pills will likely be prescribed. You may also get an injection of antibiotics.  For more serious cases  Severe cases of PID need to be treated in the hospital. There, you will get antibiotics through an IV (intravenous) line. Your health will be closely watched. The length of your hospital stay depends on how sick you are. After you leave the hospital, antibiotic pills  will likely be prescribed. If complications have occurred, you may need surgery to help treat them.  Make sure your partner gets treated  The bacteria that cause PID can also have harmful effects in men. Any partner you have had sex with in the past 60 days should be treated for chlamydia and gonorrhea. Your partner must be treated with antibiotics as well.  During treatment  Follow all your healthcare provider s instructions. This will help you heal. During treatment:    Finish all of your antibiotics, even if you start to feel better. Otherwise, the infection might not go away. It could even get worse and become harder to treat.    Don t have sex until both you and your partner have finished all of your antibiotics.    Relieve pelvic pain with a heating pad, hot water bottle, or ice pack. Your healthcare provider may also suggest a prescription or over-the-counter pain medicine.    Ask your healthcare provider if you should avoid alcohol, which can react with some antibiotics.  If you take birth control pills  When both you and your partner have finished your antibiotics, it s okay to have sex. Use latex condoms to prevent future infections. Also, keep taking your birth control pills on schedule. Remember: Birth control pills help prevent pregnancy, but do not protect against sexually transmitted infections (STIs).  If the infection returns  Even after treatment, PID can come back. This could happen if you re infected by another STI. But be aware that once you ve had PID, bacteria that are normally harmless may be more likely to infect your upper genital tract. This means you could get PID again even without getting another STI. With each PID infection, the chances of complications go up.  When to call your healthcare provider  While you re being treated for PID, call your healthcare provider if you have any of the following:    A fever of 100.4 F (38 C) or higher, or as directed by your healthcare  "provider    Worsening pelvic pain    Vomiting    A rash    Severe diarrhea   Date Last Reviewed: 5/18/2015 2000-2017 [employer]. [address] [place] [address], [place] [address]. All rights reserved. This information is not intended as a substitute for professional medical care. Always follow your healthcare professional's instructions.                Follow-ups after your visit        Who to contact     If you have questions or need follow up information about today's clinic visit or your schedule please contact [facility] directly at [phone].  Normal or non-critical lab and imaging results will be communicated to you by [employer], letter or phone within 4 business days after the clinic has received the results. If you do not hear from us within 7 days, please contact the clinic through [employer] or phone. If you have a critical or abnormal lab result, we will notify you by phone as soon as possible.  Submit refill requests through .com or call your pharmacy and they will forward the refill request to us. Please allow 3 business days for your refill to be completed.          Additional Information About Your Visit        [employer] Information     .com gives you secure access to your electronic health record. If you see a primary care provider, you can also send messages to your care team and make appointments. If you have questions, please call your primary care clinic.  If you do not have a primary care provider, please call [phone] and they will assist you.        Care EveryWhere ID     This is your Care EveryWhere ID. This could be used by other organizations to access your [facility] medical records  [id number]        Your Vitals Were     Pulse Temperature Respirations Height Pulse Oximetry Breastfeeding?    74 98.2  F (36.8  C) (Oral) 16 5' 6\" (1.676 m) 98% No    BMI (Body Mass Index)                   20.98 kg/m2            Blood Pressure from Last 3 Encounters: "   08/11/17 102/68   07/10/17 123/83   05/16/17 113/78    Weight from Last 3 Encounters:   08/11/17 130 lb (59 kg)   07/10/17 129 lb 6.4 oz (58.7 kg)   05/16/17 129 lb (58.5 kg)              We Performed the Following     *UA reflex to Microscopic and Culture ([place] [facility] (except [place] and [place])     Wet prep          Today's Medication Changes          These changes are accurate as of: 8/11/17  7:45 PM.  If you have any questions, ask your nurse or doctor.               Start taking these medicines.        Dose/Directions    cefTRIAXone 1 GM vial   Commonly known as:  ROCEPHIN   Used for:  Acute pelvic inflammatory disease (PID)   Started by:  Sherlyn James MD        Dose:  1000 mg   Inject 1 g (1,000 mg) into the muscle once for 1 dose   Quantity:  10 mL   Refills:  0       doxycycline 100 MG capsule   Commonly known as:  VIBRAMYCIN   Used for:  Acute pelvic inflammatory disease (PID)   Started by:  Sherlyn James MD        Dose:  100 mg   Take 1 capsule (100 mg) by mouth 2 times daily for 10 days   Quantity:  20 capsule   Refills:  0       metroNIDAZOLE 500 MG tablet   Commonly known as:  FLAGYL   Used for:  Acute pelvic inflammatory disease (PID)   Started by:  Sherlyn James MD        Dose:  500 mg   Take 1 tablet (500 mg) by mouth 3 times daily for 10 days   Quantity:  30 tablet   Refills:  0       terconazole 0.8 % cream   Commonly known as:  TERAZOL 3   Used for:  Yeast infection of the vagina   Started by:  Sherlyn James MD        Dose:  1 applicator   Place 1 applicator (1 g) vaginally At Bedtime for 3 days   Quantity:  40 g   Refills:  0            Where to get your medicines      These medications were sent to [facility] [address] [place], [place] - [address] AT [facility] [address] [facility]  [address] [facility] [address]     Phone:  [phone]     doxycycline 100 MG capsule    metroNIDAZOLE 500 MG tablet    terconazole 0.8 % cream          Some of these will need a paper prescription and others can be bought over the counter.  Ask your nurse if you have questions.     You don't need a prescription for these medications     cefTRIAXone 1 GM vial                Primary Care Provider Office Phone # Fax #    Patrica Black PA-C [phone]       [address] [facility] [address]        Equal Access to Services     [facility]: Hadii aad ku hadasho Soomaali, waaxda luqadaha, qaybta kaalmada adeegyada, waxay idiin hayaan adeeg kharash la'aan ah. So [facility] [phone].    ATENCIÓN: Si monicala diaz, tiene a jolly disposición servicios gratuitos de asistencia lingüística. [facility] [phone].    We comply with applicable federal civil rights laws and [place] laws. We do not discriminate on the basis of race, color, national origin, age, disability sex, sexual orientation or gender identity.            Thank you!     Thank you for choosing [facility]  for your care. Our goal is always to provide you with excellent care. Hearing back from our patients is one way we can continue to improve our services. Please take a few minutes to complete the written survey that you may receive in the mail after your visit with us. Thank you!             Your Updated Medication List - Protect others around you: Learn how to safely use, store and throw away your medicines at www.disposemymeds.org.          This list is accurate as of: 8/11/17  7:45 PM.  Always use your most recent med list.                   Brand Name Dispense Instructions for use Diagnosis    cefTRIAXone 1 GM vial    ROCEPHIN    10 mL    Inject 1 g (1,000 mg) into the muscle once for 1 dose    Acute pelvic inflammatory disease (PID)       CYMBALTA PO      Take 60 mg by mouth        doxycycline 100 MG capsule    VIBRAMYCIN    20 capsule    Take 1 capsule (100 mg) by mouth 2 times daily for 10 days    Acute pelvic inflammatory disease (PID)        levonorgestrel-ethinyl estradiol 0.15-30 MG-MCG per tablet    NORDETTE     Take 1 tablet by mouth daily        metroNIDAZOLE 500 MG tablet    FLAGYL    30 tablet    Take 1 tablet (500 mg) by mouth 3 times daily for 10 days    Acute pelvic inflammatory disease (PID)       terconazole 0.8 % cream    TERAZOL 3    40 g    Place 1 applicator (1 g) vaginally At Bedtime for 3 days    Yeast infection of the vagina       traZODone 50 MG tablet    DESYREL    90 tablet    TAKE 1 TABLET(50 MG) BY MOUTH EVERY NIGHT AS NEEDED FOR SLEEP    Insomnia, unspecified type

## 2024-10-16 NOTE — ASSESSMENT & PLAN NOTE
Continue routine post partum care  Encourage ambulation  Encourage breastfeeding  Contraception: OCPs  Anticipate discharge PPD 3 pending blood pressures

## 2024-10-16 NOTE — PLAN OF CARE
Problem: POSTPARTUM  Goal: Experiences normal postpartum course  Description: INTERVENTIONS:  - Monitor maternal vital signs  - Assess uterine involution and lochia  Outcome: Progressing  Goal: Appropriate maternal -  bonding  Description: INTERVENTIONS:  - Identify family support  - Assess for appropriate maternal/infant bonding   -Encourage maternal/infant bonding opportunities  - Referral to  or  as needed  Outcome: Progressing  Goal: Establishment of infant feeding pattern  Description: INTERVENTIONS:  - Assess breast/bottle feeding  - Refer to lactation as needed  Outcome: Progressing  Goal: Incision(s), wounds(s) or drain site(s) healing without S/S of infection  Description: INTERVENTIONS  - Assess and document dressing, incision, wound bed, drain sites and surrounding tissue  - Provide patient and family education  - Perform skin care/dressing changes every me: Progressing

## 2024-10-16 NOTE — CASE MANAGEMENT
Case Management Progress Note    Patient name Brian Guthrie  Location /-01 MRN [id number]  : 2005 Date 10/16/2024       LOS (days): 2  Geometric Mean LOS (GMLOS) (days):   Days to GMLOS:        OBJECTIVE:        Current admission status: Inpatient  Preferred Pharmacy:   [id number] - [place] - [address] [facility]  [address] [facility] [address]  Phone: [phone] Fax: [phone]    Primary Care Provider: Yoel Bowman    Primary Insurance: [email or web address]  Secondary Insurance:     PROGRESS NOTE:    CM received CM consult for Zomee Z2 breast pump. [employer] confirmed that Zomee Z2 is approved, pump delivered to bedside. Delivery ticket in folder for liaison.

## 2024-10-17 LAB
ALBUMIN SERPL BCG-MCNC: 3 G/DL (ref 3.5–5)
ALP SERPL-CCNC: 136 U/L (ref 34–104)
ALT SERPL W P-5'-P-CCNC: 98 U/L (ref 7–52)
ANION GAP SERPL CALCULATED.3IONS-SCNC: 5 MMOL/L (ref 4–13)
AST SERPL W P-5'-P-CCNC: 64 U/L (ref 13–39)
BILIRUB SERPL-MCNC: 0.29 MG/DL (ref 0.2–1)
BUN SERPL-MCNC: 8 MG/DL (ref 5–25)
CALCIUM ALBUM COR SERPL-MCNC: 8.9 MG/DL (ref 8.3–10.1)
CALCIUM SERPL-MCNC: 8.1 MG/DL (ref 8.4–10.2)
CHLORIDE SERPL-SCNC: 106 MMOL/L (ref 96–108)
CO2 SERPL-SCNC: 27 MMOL/L (ref 21–32)
CREAT SERPL-MCNC: 0.58 MG/DL (ref 0.6–1.3)
ERYTHROCYTE [DISTWIDTH] IN BLOOD BY AUTOMATED COUNT: 13.6 % (ref 11.6–15.1)
GFR SERPL CREATININE-BSD FRML MDRD: 133 ML/MIN/1.73SQ M
GLUCOSE SERPL-MCNC: 74 MG/DL (ref 65–140)
HCT VFR BLD AUTO: 30 % (ref 34.8–46.1)
HGB BLD-MCNC: 9.8 G/DL (ref 11.5–15.4)
MAGNESIUM SERPL-MCNC: 2.7 MG/DL (ref 1.9–2.7)
MCH RBC QN AUTO: 28.7 PG (ref 26.8–34.3)
MCHC RBC AUTO-ENTMCNC: 32.7 G/DL (ref 31.4–37.4)
MCV RBC AUTO: 88 FL (ref 82–98)
PLATELET # BLD AUTO: 283 THOUSANDS/UL (ref 149–390)
PMV BLD AUTO: 10.2 FL (ref 8.9–12.7)
POTASSIUM SERPL-SCNC: 4.3 MMOL/L (ref 3.5–5.3)
PROT SERPL-MCNC: 6 G/DL (ref 6.4–8.4)
RBC # BLD AUTO: 3.41 MILLION/UL (ref 3.81–5.12)
SODIUM SERPL-SCNC: 138 MMOL/L (ref 135–147)
WBC # BLD AUTO: 11.34 THOUSAND/UL (ref 4.31–10.16)

## 2024-10-17 PROCEDURE — 80053 COMPREHEN METABOLIC PANEL: CPT

## 2024-10-17 PROCEDURE — 85027 COMPLETE CBC AUTOMATED: CPT

## 2024-10-17 PROCEDURE — 99024 POSTOP FOLLOW-UP VISIT: CPT | Performed by: OBSTETRICS & GYNECOLOGY

## 2024-10-17 PROCEDURE — 83735 ASSAY OF MAGNESIUM: CPT

## 2024-10-17 RX ORDER — IBUPROFEN 600 MG/1
600 TABLET, FILM COATED ORAL EVERY 6 HOURS
Qty: 30 TABLET | Refills: 0 | Status: SHIPPED | OUTPATIENT
Start: 2024-10-17

## 2024-10-17 RX ORDER — ACETAMINOPHEN 325 MG/1
650 TABLET ORAL EVERY 4 HOURS PRN
Qty: 30 TABLET | Refills: 0 | Status: SHIPPED | OUTPATIENT
Start: 2024-10-17

## 2024-10-17 RX ORDER — NIFEDIPINE 30 MG/1
30 TABLET, EXTENDED RELEASE ORAL DAILY
Status: DISCONTINUED | OUTPATIENT
Start: 2024-10-17 | End: 2024-10-18 | Stop reason: HOSPADM

## 2024-10-17 RX ORDER — FERROUS SULFATE 325(65) MG
325 TABLET ORAL
Status: DISCONTINUED | OUTPATIENT
Start: 2024-10-17 | End: 2024-10-18 | Stop reason: HOSPADM

## 2024-10-17 RX ORDER — ADHESIVE BANDAGE 3/4"
BANDAGE TOPICAL 2 TIMES DAILY
Qty: 1 EACH | Refills: 0 | Status: SHIPPED | OUTPATIENT
Start: 2024-10-17

## 2024-10-17 RX ORDER — BENZOCAINE/MENTHOL 6 MG-10 MG
1 LOZENGE MUCOUS MEMBRANE DAILY PRN
Start: 2024-10-17 | End: 2024-10-18

## 2024-10-17 RX ORDER — NIFEDIPINE 30 MG/1
30 TABLET, EXTENDED RELEASE ORAL DAILY
Qty: 30 TABLET | Refills: 1 | Status: SHIPPED | OUTPATIENT
Start: 2024-10-17

## 2024-10-17 RX ADMIN — IBUPROFEN 600 MG: 600 TABLET, FILM COATED ORAL at 05:41

## 2024-10-17 RX ADMIN — FERROUS SULFATE TAB 325 MG (65 MG ELEMENTAL FE) 325 MG: 325 (65 FE) TAB at 11:22

## 2024-10-17 RX ADMIN — IBUPROFEN 600 MG: 600 TABLET, FILM COATED ORAL at 19:23

## 2024-10-17 RX ADMIN — NIFEDIPINE 30 MG: 30 TABLET, FILM COATED, EXTENDED RELEASE ORAL at 15:46

## 2024-10-17 RX ADMIN — PRENATAL VIT W/ FE FUMARATE-FA TAB 27-0.8 MG 1 TABLET: 27-0.8 TAB at 11:04

## 2024-10-17 RX ADMIN — IBUPROFEN 600 MG: 600 TABLET, FILM COATED ORAL at 11:22

## 2024-10-17 NOTE — PROGRESS NOTES
Progress Note - OB/GYN   Brian Guthrie 19 y.o. female MRN: [id number]  Unit/Bed#:  423-01 Encounter: [id number]      Assessment/Plan    Brian Guthrie is a 19 y.o.  who is PPD 2 s/p VAVD at 39w3d     Pre-eclampsia with severe features  Assessment & Plan  Elevated blood pressures 4 hours apart in the absence of proteinuria, with elevated AST//116.  CBC wnl, CMP notable for AST//116; P/C 0.2  AST//116 >> 168/167 >> 87/121 > 64/98  S/p 24h postpartum magnesium   Monitor BP    Systolic (24hrs), Av , Min:123 , Max:145   Diastolic (24hrs), Av, Min:75, Max:93            * Vacuum-assisted vaginal delivery  Assessment & Plan  Continue routine post partum care  Encourage ambulation  Encourage breastfeeding  Contraception: OCPs  Anticipate discharge PPD 2      Disposition: Anticipate discharge home postpartum Day #2  Barriers to discharge: blood pressures, s/p magnesium, ongoing couplet care (NICU)      Subjective/Objective     Subjective: Postpartum state. Denies headache, visual changes, CP, SOB, RUQ/epigastric pain, swelling.     Pain: no  Tolerating PO: yes  Voiding: yes  Flatus: yes  Ambulating: yes  Breastfeeding: Breastfeeding  Chest pain: no  Shortness of breath: no  Leg pain: no  Lochia: wnl    Objective:     Vitals:  Vitals:    10/16/24 1457 10/16/24 1928 10/16/24 2302 10/17/24 0353   BP: 129/75 145/93 127/78 123/80   BP Location: Right arm Right arm Right arm Right arm   Pulse: 73 90 66 70   Resp: 18 18 18 18   Temp: 97.6 °F (36.4 °C) (!) 97 °F (36.1 °C) 98 °F (36.7 °C) 98.6 °F (37 °C)   TempSrc: Oral Temporal Oral Oral   SpO2: 99% 98% 98% 99%   Weight:       Height:           Physical Exam:   GEN: appears well, alert and oriented x 3, pleasant and cooperative   CV: Regular rate  RESP: non labored breathing  ABDOMEN: soft, no tenderness, no distention, Uterine fundus firm and non-tender,  below the umbilicus  EXTREMITIES: non-tender  NEURO Alert and oriented to person,  place, and time.       Lab Results   Component Value Date    WBC 11.34 (H) 10/17/2024    HGB 9.8 (L) 10/17/2024    HCT 30.0 (L) 10/17/2024    MCV 88 10/17/2024     10/17/2024         Francoise Bills MD  Obstetrics & Gynecology  10/17/24

## 2024-10-17 NOTE — QUICK NOTE
This patient is Enrolled in the OB Postpartum Blood Pressure Monitoring Program.      Francoise Bills MD  OBGYN, PGY-I  10/17/24  7:16 AM

## 2024-10-18 ENCOUNTER — LACTATION ENCOUNTER (OUTPATIENT)
Dept: OTHER | Facility: HOSPITAL | Age: 19
End: 2024-10-18

## 2024-10-18 VITALS
SYSTOLIC BLOOD PRESSURE: 129 MMHG | HEART RATE: 69 BPM | RESPIRATION RATE: 16 BRPM | HEIGHT: 61 IN | BODY MASS INDEX: 34.14 KG/M2 | TEMPERATURE: 97.9 F | OXYGEN SATURATION: 98 % | DIASTOLIC BLOOD PRESSURE: 82 MMHG | WEIGHT: 180.8 LBS

## 2024-10-18 PROCEDURE — NC001 PR NO CHARGE: Performed by: OBSTETRICS & GYNECOLOGY

## 2024-10-18 PROCEDURE — 99024 POSTOP FOLLOW-UP VISIT: CPT | Performed by: OBSTETRICS & GYNECOLOGY

## 2024-10-18 RX ORDER — BENZOCAINE/MENTHOL 6 MG-10 MG
1 LOZENGE MUCOUS MEMBRANE DAILY PRN
Start: 2024-10-18

## 2024-10-18 RX ADMIN — NIFEDIPINE 30 MG: 30 TABLET, FILM COATED, EXTENDED RELEASE ORAL at 08:00

## 2024-10-18 RX ADMIN — FERROUS SULFATE TAB 325 MG (65 MG ELEMENTAL FE) 325 MG: 325 (65 FE) TAB at 07:55

## 2024-10-18 RX ADMIN — IBUPROFEN 600 MG: 600 TABLET, FILM COATED ORAL at 10:47

## 2024-10-18 RX ADMIN — IBUPROFEN 600 MG: 600 TABLET, FILM COATED ORAL at 01:44

## 2024-10-18 RX ADMIN — PRENATAL VIT W/ FE FUMARATE-FA TAB 27-0.8 MG 1 TABLET: 27-0.8 TAB at 08:00

## 2024-10-18 NOTE — PLAN OF CARE
Problem: POSTPARTUM  Goal: Experiences normal postpartum course  Description: INTERVENTIONS:  - Monitor maternal vital signs  - Assess uterine involution and lochia  Outcome: Progressing  Goal: Appropriate maternal -  bonding  Description: INTERVENTIONS:  - Identify family support  - Assess for appropriate maternal/infant bonding   -Encourage maternal/infant bonding opportunities  - Referral to  or  as needed  Outcome: Progressing  Goal: Establishment of infant feeding pattern  Description: INTERVENTIONS:  - Assess breast/bottle feeding  - Refer to lactation as needed  Outcome: Progressing  Goal: Incision(s), wounds(s) or drain site(s) healing without S/S of infection  Description: INTERVENTIONS  - Assess and document dressing, incision, wound bed, drain sites and surrounding tissue  - Provide patient and family education  - Perform skin care/dressing changes every day  Problem: PAIN - ADULT  Goal: Verbalizes/displays adequate comfort level or baseline comfort level  Description: Interventions:  - Encourage patient to monitor pain and request assistance  - Assess pain using appropriate pain scale  - Administer analgesics based on type and severity of pain and evaluate response  - Implement non-pharmacological measures as appropriate and evaluate response  - Consider cultural and social influences on pain and pain management  - Notify physician/advanced practitioner if interventions unsuccessful or patient reports new pain  Outcome: Progressing     Problem: INFECTION - ADULT  Goal: Absence of fever/infection during neutropenic period  Description: INTERVENTIONS:  - Monitor WBC    Outcome: Progressing     Problem: SAFETY ADULT  Goal: Maintain or return to baseline ADL function  Description: INTERVENTIONS:  -  Assess patient's ability to carry out ADLs; assess patient's baseline for ADL function and identify physical deficits which impact ability to perform ADLs (bathing, care of  mouth/teeth, toileting, grooming, dressing, etc.)  - Assess/evaluate cause of self-care deficits   - Assess range of motion  - Assess patient's mobility; develop plan if impaired  - Assess patient's need for assistive devices and provide as appropriate  - Encourage maximum independence but intervene and supervise when necessary  - Involve family in performance of ADLs  - Assess for home care needs following discharge   - Consider OT consult to assist with ADL evaluation and planning for discharge  - Provide patient education as appropriate  Outcome: Progressing     Problem: Knowledge Deficit  Goal: Patient/family/caregiver demonstrates understanding of disease process, treatment plan, medications, and discharge instructions  Description: Complete learning assessment and assess knowledge base.  Interventions:  - Provide teaching at level of understanding  - Provide teaching via preferred learning methods  Outcome: Progressing     Problem: DISCHARGE PLANNING  Goal: Discharge to home or other facility with appropriate resources  Description: INTERVENTIONS:  - Identify barriers to discharge w/patient and caregiver  - Arrange for needed discharge resources and transportation as appropriate  - Identify discharge learning needs (meds, wound care, etc.)  - Arrange for interpretive services to assist at discharge as needed  - Refer to Case Management Department for coordinating discharge planning if the patient needs post-hospital services based on physician/advanced practitioner order or complex needs related to functional status, cognitive ability, or social support system  Outcome: Progressing     Outcome: Progressing

## 2024-10-18 NOTE — UTILIZATION REVIEW
"Mother discharged on 10/18/2024  BABY in NICU     NOTIFICATION OF INPATIENT ADMISSION   MATERNITY/DELIVERY AUTHORIZATION REQUEST   SERVICING FACILITY:   [facility] - L&D, , NICU  [address] [place] [address]  Tax ID: [id number]  NPI: [id number] ATTENDING PROVIDER:  Attending Name and NPI#: Elaine Villatoro Md [[id number]]  Address: [address] [place] [address]  Phone: [phone]     ADMISSION INFORMATION:  Place of Service: Inpatient [facility]  Place of Service Code: 21  Inpatient Admission Date/Time: 10/14/24  2:55 PM  Discharge Date/Time: 10/18/2024 12:12 PM  Admitting Diagnosis Code/Description:  NST (non-stress test) nonreactive [O28.8]   Mother: Brian Guthrie 2005 Estimated Date of Delivery: 10/19/24  Delivering clinician: Juliana Santos   OB History as of 10/16/2024          1    Para   1    Term   1       0    AB   0    Living   1         SAB   0    IAB   0    Ectopic   0    Multiple   0    Live Births   1                Name & MRN:   Information for the patient's :  Jerri, Baby Boy (Brian) [[id number]]    Delivery Information:  Sex: male  Delivered 10/15/2024 3:28 PM by Vaginal, Vacuum (Extractor); Gestational Age: 39w3d     Measurements:  Weight: 6 lb 2.8 oz (2800 g);  Height: 18.9\"    APGAR 1 minute 5 minutes 10 minutes   Totals: 3 6 8      UTILIZATION REVIEW CONTACT:  Sharon Haddad Utilization   Network Utilization Review Department  Phone: [phone]  Fax [phone]  Email: [email or web address]  Contact for approvals/pending authorizations, clinical reviews, and discharge.   PHYSICIAN ADVISORY SERVICES:  Medical Necessity Denial & Vcls-ed-Qkzg Review  Phone: [phone]  Fax: [phone]  Email: [email or web address]   DISCHARGE SUPPORT TEAM:  For Patients Discharge Needs & Updates  Phone: [phone] opt. 2 " Fax: [phone]  Email: [email or web address]

## 2024-10-18 NOTE — PROGRESS NOTES
Progress Note - OB/GYN   Brian Guthrie 19 y.o. female MRN: [id number]  Unit/Bed#:  423-01 Encounter: [id number]      Assessment/Plan    Brian Guthrie is a 19 y.o.  who is PPD 3 s/p VAVD at 39w3d     Pre-eclampsia with severe features  Assessment & Plan  Elevated blood pressures 4 hours apart in the absence of proteinuria, with elevated AST//116.  CBC wnl, CMP notable for AST//116; P/C 0.2  AST//116 >> 168/167 >> 87/121 > 64/98  S/p 24h postpartum magnesium   Monitor BP  Enrolled in OB Postpartum Blood Pressure Monitoring Program  10/17: Procardia XL 30 mg qD    Systolic (24hrs), Av , Min:126 , Max:144   Diastolic (24hrs), Av, Min:80, Max:99          * Vacuum-assisted vaginal delivery  Assessment & Plan  Continue routine post partum care  Encourage ambulation  Encourage breastfeeding  Contraception: OCPs  Anticipate discharge PPD 3 pending blood pressures        Disposition: Anticipate discharge home postpartum Day #3   Barriers to discharge: blood pressure      Subjective/Objective     Subjective: Postpartum state. Denies headache, visual changes, SOB, RUQ/epigastric pain, swelling.     Pain: no  Tolerating PO: yes  Voiding: yes  Flatus: yes  Ambulating: yes  Breastfeeding: Breastfeeding  Chest pain: no  Shortness of breath: no  Leg pain: no  Lochia: wnl    Objective:     Vitals:  Vitals:    10/17/24 1544 10/17/24 1900 10/17/24 2346 10/18/24 0359   BP: 141/86 138/80 127/96 126/82   BP Location:  Right arm Left arm Left arm   Pulse: 71 81 79 78   Resp:    Temp: 98 °F (36.7 °C) (!) 97.3 °F (36.3 °C) 97.7 °F (36.5 °C) 97.8 °F (36.6 °C)   TempSrc: Oral Oral Oral Oral   SpO2: 100% 100% 99% 98%   Weight:       Height:           Physical Exam:   GEN: appears well, alert and oriented x 3, pleasant and cooperative   CV: Regular rate  RESP: non labored breathing  ABDOMEN: soft, no tenderness, no distention, Uterine fundus firm and non-tender,  below the  umbilicus  EXTREMITIES: non-tender  NEURO Alert and oriented to person, place, and time.       Lab Results   Component Value Date    WBC 11.34 (H) 10/17/2024    HGB 9.8 (L) 10/17/2024    HCT 30.0 (L) 10/17/2024    MCV 88 10/17/2024     10/17/2024         Francoise Bills MD  Obstetrics & Gynecology  10/18/24

## 2024-10-19 ENCOUNTER — TELEPHONE (OUTPATIENT)
Dept: OTHER | Facility: HOSPITAL | Age: 19
End: 2024-10-19

## 2024-10-19 NOTE — LACTATION NOTE
This note was copied from a baby's chart.    Called down to NICU for first attempts at bottle feeding after circumcision today. Baby remains tired with little effort at breast after good hand expression. Multiple attempts over multiple feeding today with little effort. Staff states baby takes bottle but baby very sleepy with chomping effort on bottle & pacifier noted as well as tongue restriction.     Mom has been pumping well. Encouraged bottle feeding breastmilk and seek follow up at baby and me .      10/18/24 1200   Maternal Information   Has mother  before? No   Infant to breast within first hour of birth? No   Breastfeeding Delayed Due to Infant status   Exclusive Pump and Bottle Feed No  (has been pumping for baby in NICU)   LATCH Documentation   Latch 1   Audible Swallowing 1  (with hand expression)   Type of Nipple 2   Comfort (Breast/Nipple) 2   Hold (Positioning) 1   LATCH Score 7   Having latch problems? Yes  (baby sleepy, weak and not coordinated suck)   Position(s) Used Football   Breasts/Nipples   Left Breast Filling   Right Breast Filling   Left Nipple Everted   Right Nipple Everted   Intervention Breast pump;Other (comment)  (latch attempts)   Breastfeeding Progress Not yet established;Latch issues;Other issues (comment)   Reasons for not Breastfeeding Infant medical condition   Other OB Lactation Tools   Feeding Devices Pump;Bottle   Breast Pump   Pump 2;1;3   Pump Review/Education Milk storage   Patient Follow-Up   Lactation Consult Status 2   Follow-Up Type Inpatient;Call as needed   Other OB Lactation Documentation    Additional Problem Noted Pumping well, baby not with stong OR coordinated sucking effort on pacifeier, bottle or at breast  (Tongue restriction noted; has Booklets & follow information)       Encouraged parents to call for assistance, questions, and concerns about breastfeeding.  Extension provided.

## 2024-10-19 NOTE — TELEPHONE ENCOUNTER
Patient is on the Remote BP Monitoring Program and at 1340 submitted a BP of 127/94 pulse 113. Called and spoke with patient and asked that she sit and rest for 15-20 mins and resubmit another BP and patient agreeable. Will await repeat BP submission and monitor per protocol.

## 2024-10-20 ENCOUNTER — TELEPHONE (OUTPATIENT)
Dept: OTHER | Facility: HOSPITAL | Age: 19
End: 2024-10-20

## 2024-10-20 NOTE — TELEPHONE ENCOUNTER
Patient is enrolled in the Post Partum BP Monitoring Program.  At 1752, /95.  Calll placed to patient, voicemail message left asking her to recheck her BP.  Call back number for [facility] left in the event of questions. Will monitor for new data.

## 2024-10-21 ENCOUNTER — TELEPHONE (OUTPATIENT)
Dept: PERINATAL CARE | Facility: OTHER | Age: 19
End: 2024-10-21

## 2024-10-21 PROCEDURE — 88307 TISSUE EXAM BY PATHOLOGIST: CPT | Performed by: PATHOLOGY

## 2024-10-21 NOTE — TELEPHONE ENCOUNTER
Blood pressure monitoring program. Brian had a medium alert blood pressure at 11:40 AM of 143/103.  Left a message at 1:30 PM to assess for abdominal pain, visual changes or headaches.  She was advised to reach out to her OB with any symptoms.  I also wanted to verify that she is taking her nifedipine 30 mg daily as this is her third alert in 3 days.  She was asked to submit a another blood pressure reading.  Message sent to SOD and alert cleared

## 2024-10-23 ENCOUNTER — TELEPHONE (OUTPATIENT)
Dept: PERINATAL CARE | Facility: CLINIC | Age: 19
End: 2024-10-23

## 2024-10-23 NOTE — TELEPHONE ENCOUNTER
Blood pressure monitoring program.Brian had a medium alert blood pressure at 1012 AM of 128/102.  Left a message to assess for headaches, visual changes or abdominal pain.  She was advised to reach out to her OB with any concerns or complaints and to submit another reading.  Epic message sent to OB and alert cleared.

## 2024-10-28 ENCOUNTER — TELEPHONE (OUTPATIENT)
Dept: OBGYN CLINIC | Facility: MEDICAL CENTER | Age: 19
End: 2024-10-28

## 2024-10-30 NOTE — TELEPHONE ENCOUNTER
Patient has been noncompliant in BP monitoring program.  Has not submitted blood pressure readings since 10/28/24.  Called to remind patient to submit readings.  Left reminder message on voicemail to submit BP readings twice daily.

## 2024-10-31 NOTE — TELEPHONE ENCOUNTER
Patient has been noncompliant in BP monitoring program.  Has not submitted blood pressure readings since 10/28/24.  Called to remind patient to submit readings.   Attempted to contact pt - when I identified myself, call was disconnected

## 2024-11-01 NOTE — TELEPHONE ENCOUNTER
Per Dr Frazier. Pt to be unenrolled from the BP monitoring program.  [facility] nurse notified

## 2024-11-06 ENCOUNTER — TELEPHONE (OUTPATIENT)
Dept: OBGYN CLINIC | Facility: CLINIC | Age: 19
End: 2024-11-06